# Patient Record
Sex: MALE | Race: WHITE | URBAN - METROPOLITAN AREA
[De-identification: names, ages, dates, MRNs, and addresses within clinical notes are randomized per-mention and may not be internally consistent; named-entity substitution may affect disease eponyms.]

---

## 2017-05-16 ENCOUNTER — HISTORICAL (OUTPATIENT)
Dept: RADIOLOGY | Facility: HOSPITAL | Age: 57
End: 2017-05-16

## 2017-10-26 ENCOUNTER — HISTORICAL (OUTPATIENT)
Dept: ADMINISTRATIVE | Facility: HOSPITAL | Age: 57
End: 2017-10-26

## 2017-11-01 LAB
FINAL CULTURE: NORMAL
FINAL CULTURE: NORMAL

## 2017-11-20 ENCOUNTER — HOSPITAL ENCOUNTER (OUTPATIENT)
Dept: ONCOLOGY | Facility: HOSPITAL | Age: 57
End: 2017-11-21
Attending: SURGERY | Admitting: SURGERY

## 2017-11-20 LAB
ABS NEUT (OLG): 5.4 X10(3)/MCL (ref 2.1–9.2)
ALBUMIN SERPL-MCNC: 2.5 GM/DL (ref 3.4–5)
ALBUMIN/GLOB SERPL: 0.5 RATIO (ref 1.1–2)
ALP SERPL-CCNC: 67 UNIT/L (ref 50–136)
ALT SERPL-CCNC: 17 UNIT/L (ref 12–78)
APTT PPP: 49.1 SECOND(S) (ref 24.8–36.9)
AST SERPL-CCNC: 19 UNIT/L (ref 15–37)
BASOPHILS # BLD AUTO: 0 X10(3)/MCL (ref 0–0.2)
BASOPHILS NFR BLD AUTO: 1 %
BILIRUB SERPL-MCNC: 0.4 MG/DL (ref 0.2–1)
BILIRUBIN DIRECT+TOT PNL SERPL-MCNC: 0.1 MG/DL (ref 0–0.5)
BILIRUBIN DIRECT+TOT PNL SERPL-MCNC: 0.3 MG/DL (ref 0–0.8)
BUN SERPL-MCNC: 8 MG/DL (ref 7–18)
CALCIUM SERPL-MCNC: 9 MG/DL (ref 8.5–10.1)
CHLORIDE SERPL-SCNC: 100 MMOL/L (ref 98–107)
CO2 SERPL-SCNC: 28 MMOL/L (ref 21–32)
COLOR STL: NORMAL
CONSISTENCY STL: NORMAL
CREAT SERPL-MCNC: 0.84 MG/DL (ref 0.7–1.3)
CROSSMATCH INTERPRETATION: NORMAL
EOSINOPHIL # BLD AUTO: 0.3 X10(3)/MCL (ref 0–0.9)
EOSINOPHIL NFR BLD AUTO: 4 %
ERYTHROCYTE [DISTWIDTH] IN BLOOD BY AUTOMATED COUNT: 16.4 % (ref 11.5–17)
GLOBULIN SER-MCNC: 4.8 GM/DL (ref 2.4–3.5)
GLUCOSE SERPL-MCNC: 101 MG/DL (ref 74–106)
GROUP & RH: NORMAL
HCT VFR BLD AUTO: 26.6 % (ref 42–52)
HEMOCCULT SP1 STL QL: NEGATIVE
HGB BLD-MCNC: 7.9 GM/DL (ref 14–18)
INR PPP: 1.54 (ref 0–1.27)
LYMPHOCYTES # BLD AUTO: 1.3 X10(3)/MCL (ref 0.6–4.6)
LYMPHOCYTES NFR BLD AUTO: 16 %
MCH RBC QN AUTO: 27 PG (ref 27–31)
MCHC RBC AUTO-ENTMCNC: 29.7 GM/DL (ref 33–36)
MCV RBC AUTO: 90.8 FL (ref 80–94)
MONOCYTES # BLD AUTO: 0.9 X10(3)/MCL (ref 0.1–1.3)
MONOCYTES NFR BLD AUTO: 12 %
NEUTROPHILS # BLD AUTO: 5.4 X10(3)/MCL (ref 1.4–7.9)
NEUTROPHILS NFR BLD AUTO: 68 %
PLATELET # BLD AUTO: 332 X10(3)/MCL (ref 130–400)
PMV BLD AUTO: 9.9 FL (ref 9.4–12.4)
POTASSIUM SERPL-SCNC: 4 MMOL/L (ref 3.5–5.1)
PRODUCT READY: NORMAL
PRODUCT READY: NORMAL
PROT SERPL-MCNC: 7.3 GM/DL (ref 6.4–8.2)
PROTHROMBIN TIME: 19 SECOND(S) (ref 12.2–14.7)
RBC # BLD AUTO: 2.93 X10(6)/MCL (ref 4.7–6.1)
SODIUM SERPL-SCNC: 134 MMOL/L (ref 136–145)
TRANSFUSION ORDER: NORMAL
WBC # SPEC AUTO: 8 X10(3)/MCL (ref 4.5–11.5)

## 2017-11-21 LAB
ABS NEUT (OLG): 5.03 X10(3)/MCL (ref 2.1–9.2)
BASOPHILS # BLD AUTO: 0 X10(3)/MCL (ref 0–0.2)
BASOPHILS NFR BLD AUTO: 1 %
COLOR STL: NORMAL
CONSISTENCY STL: NORMAL
EOSINOPHIL # BLD AUTO: 0.3 X10(3)/MCL (ref 0–0.9)
EOSINOPHIL NFR BLD AUTO: 4 %
ERYTHROCYTE [DISTWIDTH] IN BLOOD BY AUTOMATED COUNT: 16.1 % (ref 11.5–17)
HCT VFR BLD AUTO: 31.4 % (ref 42–52)
HEMOCCULT SP2 STL QL: NEGATIVE
HGB BLD-MCNC: 9.7 GM/DL (ref 14–18)
LYMPHOCYTES # BLD AUTO: 1 X10(3)/MCL (ref 0.6–4.6)
LYMPHOCYTES NFR BLD AUTO: 14 %
MCH RBC QN AUTO: 27.6 PG (ref 27–31)
MCHC RBC AUTO-ENTMCNC: 30.9 GM/DL (ref 33–36)
MCV RBC AUTO: 89.2 FL (ref 80–94)
MONOCYTES # BLD AUTO: 0.8 X10(3)/MCL (ref 0.1–1.3)
MONOCYTES NFR BLD AUTO: 12 %
NEUTROPHILS # BLD AUTO: 5.03 X10(3)/MCL (ref 1.4–7.9)
NEUTROPHILS NFR BLD AUTO: 70 %
PLATELET # BLD AUTO: 312 X10(3)/MCL (ref 130–400)
PMV BLD AUTO: 9.6 FL (ref 9.4–12.4)
RBC # BLD AUTO: 3.52 X10(6)/MCL (ref 4.7–6.1)
WBC # SPEC AUTO: 7.2 X10(3)/MCL (ref 4.5–11.5)

## 2017-11-27 LAB
FINAL CULTURE: NORMAL
FINAL CULTURE: NORMAL

## 2017-12-04 ENCOUNTER — HOSPITAL ENCOUNTER (OUTPATIENT)
Dept: EMERGENCY MEDICINE | Facility: HOSPITAL | Age: 57
End: 2017-12-05
Attending: SURGERY | Admitting: SURGERY

## 2017-12-04 LAB
ABS NEUT (OLG): 5.52 X10(3)/MCL (ref 2.1–9.2)
ALBUMIN SERPL-MCNC: 2.6 GM/DL (ref 3.4–5)
ALBUMIN/GLOB SERPL: 0.5 RATIO (ref 1.1–2)
ALP SERPL-CCNC: 79 UNIT/L (ref 50–136)
ALT SERPL-CCNC: 11 UNIT/L (ref 12–78)
APPEARANCE, UA: ABNORMAL
APTT PPP: 74.5 SECOND(S) (ref 24.8–36.9)
AST SERPL-CCNC: 16 UNIT/L (ref 15–37)
BACTERIA SPEC CULT: ABNORMAL /HPF
BASOPHILS # BLD AUTO: 0 X10(3)/MCL (ref 0–0.2)
BASOPHILS NFR BLD AUTO: 0 %
BILIRUB SERPL-MCNC: 0.5 MG/DL (ref 0.2–1)
BILIRUB UR QL STRIP: ABNORMAL
BILIRUBIN DIRECT+TOT PNL SERPL-MCNC: 0.2 MG/DL (ref 0–0.5)
BILIRUBIN DIRECT+TOT PNL SERPL-MCNC: 0.3 MG/DL (ref 0–0.8)
BUN SERPL-MCNC: 10 MG/DL (ref 7–18)
CALCIUM SERPL-MCNC: 9.4 MG/DL (ref 8.5–10.1)
CHLORIDE SERPL-SCNC: 100 MMOL/L (ref 98–107)
CO2 SERPL-SCNC: 28 MMOL/L (ref 21–32)
COLOR UR: ABNORMAL
CREAT SERPL-MCNC: 1.22 MG/DL (ref 0.7–1.3)
CROSSMATCH INTERPRETATION: NORMAL
EOSINOPHIL # BLD AUTO: 0.1 X10(3)/MCL (ref 0–0.9)
EOSINOPHIL NFR BLD AUTO: 2 %
ERYTHROCYTE [DISTWIDTH] IN BLOOD BY AUTOMATED COUNT: 16.6 % (ref 11.5–17)
FERRITIN SERPL-MCNC: 493.3 NG/ML (ref 8–388)
FOLATE SERPL-MCNC: 9 NG/ML (ref 3.1–17.5)
GLOBULIN SER-MCNC: 5.1 GM/DL (ref 2.4–3.5)
GLUCOSE (UA): NEGATIVE
GLUCOSE SERPL-MCNC: 104 MG/DL (ref 74–106)
GROUP & RH: NORMAL
HCT VFR BLD AUTO: 28.4 % (ref 42–52)
HGB BLD-MCNC: 8.5 GM/DL (ref 14–18)
HGB UR QL STRIP: ABNORMAL
INR PPP: 3.39 (ref 0–1.27)
IRON SATN MFR SERPL: 6.1 % (ref 20–50)
IRON SERPL-MCNC: 13 MCG/DL (ref 50–175)
KETONES UR QL STRIP: ABNORMAL
LEUKOCYTE ESTERASE UR QL STRIP: NEGATIVE
LYMPHOCYTES # BLD AUTO: 1.1 X10(3)/MCL (ref 0.6–4.6)
LYMPHOCYTES NFR BLD AUTO: 14 %
MCH RBC QN AUTO: 26.3 PG (ref 27–31)
MCHC RBC AUTO-ENTMCNC: 29.9 GM/DL (ref 33–36)
MCV RBC AUTO: 87.9 FL (ref 80–94)
MONOCYTES # BLD AUTO: 0.9 X10(3)/MCL (ref 0.1–1.3)
MONOCYTES NFR BLD AUTO: 12 %
NEUTROPHILS # BLD AUTO: 5.52 X10(3)/MCL (ref 1.4–7.9)
NEUTROPHILS NFR BLD AUTO: 72 %
NITRITE UR QL STRIP: NEGATIVE
PH UR STRIP: 5 [PH] (ref 5–9)
PLATELET # BLD AUTO: 351 X10(3)/MCL (ref 130–400)
PMV BLD AUTO: 9.6 FL (ref 9.4–12.4)
POTASSIUM SERPL-SCNC: 4.1 MMOL/L (ref 3.5–5.1)
PRODUCT READY: NORMAL
PROT SERPL-MCNC: 7.7 GM/DL (ref 6.4–8.2)
PROT UR QL STRIP: ABNORMAL
PROTHROMBIN TIME: 35.3 SECOND(S) (ref 12.2–14.7)
RBC # BLD AUTO: 3.23 X10(6)/MCL (ref 4.7–6.1)
RBC #/AREA URNS HPF: ABNORMAL /HPF
RET# (OHS): 0.04 X10^6/ML (ref 0.03–0.1)
RETICULOCYTE COUNT AUTOMATED (OLG): 1.3 % (ref 1.1–2.1)
SODIUM SERPL-SCNC: 137 MMOL/L (ref 136–145)
SP GR UR STRIP: 1.04 (ref 1–1.03)
SQUAMOUS EPITHELIAL, UA: ABNORMAL
TIBC SERPL-MCNC: 214 MCG/DL (ref 250–450)
TRANSFERRIN SERPL-MCNC: 157 MG/DL (ref 200–360)
TRANSFUSION ORDER: NORMAL
UROBILINOGEN UR STRIP-ACNC: 1
VIT B12 SERPL-MCNC: 400 PG/ML (ref 193–986)
WBC # SPEC AUTO: 7.7 X10(3)/MCL (ref 4.5–11.5)
WBC #/AREA URNS HPF: 24 /HPF (ref 0–3)

## 2017-12-05 LAB
ABS NEUT (OLG): 4.86 X10(3)/MCL (ref 2.1–9.2)
ABS NEUT (OLG): 5.91 X10(3)/MCL (ref 2.1–9.2)
ALBUMIN SERPL-MCNC: 2.4 GM/DL (ref 3.4–5)
ALBUMIN SERPL-MCNC: 2.5 GM/DL (ref 3.4–5)
ALBUMIN/GLOB SERPL: 0.5 {RATIO}
ALBUMIN/GLOB SERPL: 0.7 RATIO (ref 1.1–2)
ALP SERPL-CCNC: 67 UNIT/L (ref 50–136)
ALP SERPL-CCNC: 71 UNIT/L (ref 50–136)
ALT SERPL-CCNC: 10 UNIT/L (ref 12–78)
ALT SERPL-CCNC: 8 UNIT/L (ref 12–78)
AST SERPL-CCNC: 8 UNIT/L (ref 15–37)
AST SERPL-CCNC: 9 UNIT/L (ref 15–37)
BASOPHILS # BLD AUTO: 0 X10(3)/MCL (ref 0–0.2)
BASOPHILS # BLD AUTO: 0 X10(3)/MCL (ref 0–0.2)
BASOPHILS NFR BLD AUTO: 0 %
BASOPHILS NFR BLD AUTO: 0 %
BILIRUB SERPL-MCNC: 0.9 MG/DL (ref 0.2–1)
BILIRUB SERPL-MCNC: 1.4 MG/DL (ref 0.2–1)
BILIRUBIN DIRECT+TOT PNL SERPL-MCNC: 0.3 MG/DL (ref 0–0.2)
BILIRUBIN DIRECT+TOT PNL SERPL-MCNC: 0.4 MG/DL (ref 0–0.5)
BILIRUBIN DIRECT+TOT PNL SERPL-MCNC: 0.6 MG/DL (ref 0–0.8)
BILIRUBIN DIRECT+TOT PNL SERPL-MCNC: 1 MG/DL (ref 0–0.8)
BUN SERPL-MCNC: 11 MG/DL (ref 7–18)
BUN SERPL-MCNC: 11 MG/DL (ref 7–18)
CALCIUM SERPL-MCNC: 8.2 MG/DL (ref 8.5–10.1)
CALCIUM SERPL-MCNC: 8.9 MG/DL (ref 8.5–10.1)
CHLORIDE SERPL-SCNC: 103 MMOL/L (ref 98–107)
CHLORIDE SERPL-SCNC: 104 MMOL/L (ref 98–107)
CO2 SERPL-SCNC: 22 MMOL/L (ref 21–32)
CO2 SERPL-SCNC: 26 MMOL/L (ref 21–32)
CREAT SERPL-MCNC: 0.88 MG/DL (ref 0.7–1.3)
CREAT SERPL-MCNC: 1.08 MG/DL (ref 0.7–1.3)
EOSINOPHIL # BLD AUTO: 0.2 X10(3)/MCL (ref 0–0.9)
EOSINOPHIL # BLD AUTO: 0.2 X10(3)/MCL (ref 0–0.9)
EOSINOPHIL NFR BLD AUTO: 2 %
EOSINOPHIL NFR BLD AUTO: 3 %
ERYTHROCYTE [DISTWIDTH] IN BLOOD BY AUTOMATED COUNT: 16.3 % (ref 11.5–17)
ERYTHROCYTE [DISTWIDTH] IN BLOOD BY AUTOMATED COUNT: 16.4 % (ref 11.5–17)
GLOBULIN SER-MCNC: 3.6 GM/DL (ref 2.4–3.5)
GLOBULIN SER-MCNC: 4.8 GM/DL (ref 2.4–3.5)
GLUCOSE SERPL-MCNC: 80 MG/DL (ref 74–106)
GLUCOSE SERPL-MCNC: 89 MG/DL (ref 74–106)
HCT VFR BLD AUTO: 25.9 % (ref 42–52)
HCT VFR BLD AUTO: 29.7 % (ref 42–52)
HGB BLD-MCNC: 8.1 GM/DL (ref 14–18)
HGB BLD-MCNC: 9.3 GM/DL (ref 14–18)
LYMPHOCYTES # BLD AUTO: 0.8 X10(3)/MCL (ref 0.6–4.6)
LYMPHOCYTES # BLD AUTO: 0.9 X10(3)/MCL (ref 0.6–4.6)
LYMPHOCYTES NFR BLD AUTO: 10 %
LYMPHOCYTES NFR BLD AUTO: 13 %
MCH RBC QN AUTO: 27.1 PG (ref 27–31)
MCH RBC QN AUTO: 27.4 PG (ref 27–31)
MCHC RBC AUTO-ENTMCNC: 31.3 GM/DL (ref 33–36)
MCHC RBC AUTO-ENTMCNC: 31.3 GM/DL (ref 33–36)
MCV RBC AUTO: 86.6 FL (ref 80–94)
MCV RBC AUTO: 87.4 FL (ref 80–94)
MONOCYTES # BLD AUTO: 0.9 X10(3)/MCL (ref 0.1–1.3)
MONOCYTES # BLD AUTO: 1.1 X10(3)/MCL (ref 0.1–1.3)
MONOCYTES NFR BLD AUTO: 12 %
MONOCYTES NFR BLD AUTO: 15 %
NEUTROPHILS # BLD AUTO: 4.86 X10(3)/MCL (ref 2.1–9.2)
NEUTROPHILS # BLD AUTO: 5.91 X10(3)/MCL (ref 1.4–7.9)
NEUTROPHILS NFR BLD AUTO: 69 %
NEUTROPHILS NFR BLD AUTO: 75 %
PLATELET # BLD AUTO: 262 X10(3)/MCL (ref 130–400)
PLATELET # BLD AUTO: 295 X10(3)/MCL (ref 130–400)
PMV BLD AUTO: 9.3 FL (ref 9.4–12.4)
PMV BLD AUTO: 9.5 FL (ref 9.4–12.4)
POTASSIUM SERPL-SCNC: 3.6 MMOL/L (ref 3.5–5.1)
POTASSIUM SERPL-SCNC: 3.6 MMOL/L (ref 3.5–5.1)
PROT SERPL-MCNC: 6 GM/DL (ref 6.4–8.2)
PROT SERPL-MCNC: 7.3 GM/DL (ref 6.4–8.2)
RBC # BLD AUTO: 2.99 X10(6)/MCL (ref 4.7–6.1)
RBC # BLD AUTO: 3.4 X10(6)/MCL (ref 4.7–6.1)
SODIUM SERPL-SCNC: 137 MMOL/L (ref 136–145)
SODIUM SERPL-SCNC: 137 MMOL/L (ref 136–145)
WBC # SPEC AUTO: 7.1 X10(3)/MCL (ref 4.5–11.5)
WBC # SPEC AUTO: 7.9 X10(3)/MCL (ref 4.5–11.5)

## 2017-12-06 LAB — FINAL CULTURE: NO GROWTH

## 2017-12-11 LAB
FINAL CULTURE: NORMAL
FINAL CULTURE: NORMAL

## 2019-03-27 ENCOUNTER — HISTORICAL (OUTPATIENT)
Dept: WOUND CARE | Facility: HOSPITAL | Age: 59
End: 2019-03-27

## 2019-04-03 ENCOUNTER — HISTORICAL (OUTPATIENT)
Dept: WOUND CARE | Facility: HOSPITAL | Age: 59
End: 2019-04-03

## 2019-04-05 ENCOUNTER — HISTORICAL (OUTPATIENT)
Dept: WOUND CARE | Facility: HOSPITAL | Age: 59
End: 2019-04-05

## 2019-04-10 ENCOUNTER — HISTORICAL (OUTPATIENT)
Dept: WOUND CARE | Facility: HOSPITAL | Age: 59
End: 2019-04-10

## 2019-04-17 ENCOUNTER — HISTORICAL (OUTPATIENT)
Dept: WOUND CARE | Facility: HOSPITAL | Age: 59
End: 2019-04-17

## 2019-04-24 ENCOUNTER — HISTORICAL (OUTPATIENT)
Dept: WOUND CARE | Facility: HOSPITAL | Age: 59
End: 2019-04-24

## 2019-05-01 ENCOUNTER — HISTORICAL (OUTPATIENT)
Dept: WOUND CARE | Facility: HOSPITAL | Age: 59
End: 2019-05-01

## 2019-05-08 ENCOUNTER — HISTORICAL (OUTPATIENT)
Dept: WOUND CARE | Facility: HOSPITAL | Age: 59
End: 2019-05-08

## 2019-05-15 ENCOUNTER — HISTORICAL (OUTPATIENT)
Dept: WOUND CARE | Facility: HOSPITAL | Age: 59
End: 2019-05-15

## 2019-05-16 LAB — GRAM STN SPEC: NORMAL

## 2019-05-19 LAB — FINAL CULTURE: NORMAL

## 2019-05-22 ENCOUNTER — HISTORICAL (OUTPATIENT)
Dept: WOUND CARE | Facility: HOSPITAL | Age: 59
End: 2019-05-22

## 2019-06-05 ENCOUNTER — HISTORICAL (OUTPATIENT)
Dept: WOUND CARE | Facility: HOSPITAL | Age: 59
End: 2019-06-05

## 2019-06-19 ENCOUNTER — HISTORICAL (OUTPATIENT)
Dept: WOUND CARE | Facility: HOSPITAL | Age: 59
End: 2019-06-19

## 2019-07-03 ENCOUNTER — HISTORICAL (OUTPATIENT)
Dept: WOUND CARE | Facility: HOSPITAL | Age: 59
End: 2019-07-03

## 2019-07-16 ENCOUNTER — HISTORICAL (OUTPATIENT)
Dept: WOUND CARE | Facility: HOSPITAL | Age: 59
End: 2019-07-16

## 2019-07-23 ENCOUNTER — HISTORICAL (OUTPATIENT)
Dept: WOUND CARE | Facility: HOSPITAL | Age: 59
End: 2019-07-23

## 2019-07-30 ENCOUNTER — HISTORICAL (OUTPATIENT)
Dept: WOUND CARE | Facility: HOSPITAL | Age: 59
End: 2019-07-30

## 2019-08-06 ENCOUNTER — HISTORICAL (OUTPATIENT)
Dept: WOUND CARE | Facility: HOSPITAL | Age: 59
End: 2019-08-06

## 2020-01-09 ENCOUNTER — HISTORICAL (OUTPATIENT)
Dept: ADMINISTRATIVE | Facility: HOSPITAL | Age: 60
End: 2020-01-09

## 2020-01-12 LAB — FINAL CULTURE: NORMAL

## 2020-01-16 ENCOUNTER — HISTORICAL (OUTPATIENT)
Dept: ENDOSCOPY | Facility: HOSPITAL | Age: 60
End: 2020-01-16

## 2020-06-12 ENCOUNTER — HISTORICAL (OUTPATIENT)
Dept: LAB | Facility: HOSPITAL | Age: 60
End: 2020-06-12

## 2020-06-12 LAB
ABS NEUT (OLG): 4.1 X10(3)/MCL (ref 1.5–6.9)
ALBUMIN SERPL-MCNC: 3.9 GM/DL (ref 3.4–4.8)
ALBUMIN/GLOB SERPL: 1.2 RATIO (ref 1.1–2)
ALP SERPL-CCNC: 82 UNIT/L (ref 40–150)
ALT SERPL-CCNC: 20 UNIT/L (ref 0–55)
AST SERPL-CCNC: 20 UNIT/L (ref 5–34)
BILIRUB SERPL-MCNC: 0.3 MG/DL
BILIRUBIN DIRECT+TOT PNL SERPL-MCNC: 0.1 MG/DL (ref 0–0.5)
BILIRUBIN DIRECT+TOT PNL SERPL-MCNC: 0.2 MG/DL (ref 0–0.8)
BUN SERPL-MCNC: 19 MG/DL (ref 8.4–25.7)
CALCIUM SERPL-MCNC: 9.4 MG/DL (ref 8.8–10)
CHLORIDE SERPL-SCNC: 103 MMOL/L (ref 98–107)
CO2 SERPL-SCNC: 27 MMOL/L (ref 23–31)
CREAT SERPL-MCNC: 1.05 MG/DL (ref 0.73–1.18)
DIGOXIN SERPL-MCNC: 1.41 NG/ML (ref 0.8–2)
ERYTHROCYTE [DISTWIDTH] IN BLOOD BY AUTOMATED COUNT: 14.3 % (ref 11.5–17)
GLOBULIN SER-MCNC: 3.2 GM/DL (ref 2.4–3.5)
GLUCOSE SERPL-MCNC: 111 MG/DL (ref 82–115)
HCT VFR BLD AUTO: 41.1 % (ref 42–52)
HGB BLD-MCNC: 13.1 GM/DL (ref 14–18)
MCH RBC QN AUTO: 29 PG (ref 27–34)
MCHC RBC AUTO-ENTMCNC: 32 GM/DL (ref 31–36)
MCV RBC AUTO: 91 FL (ref 80–99)
PLATELET # BLD AUTO: 163 X10(3)/MCL (ref 140–400)
PMV BLD AUTO: 11.7 FL (ref 6.8–10)
POTASSIUM SERPL-SCNC: 4.5 MMOL/L (ref 3.5–5.1)
PROT SERPL-MCNC: 7.1 GM/DL (ref 5.8–7.6)
RBC # BLD AUTO: 4.5 X10(6)/MCL (ref 4.7–6.1)
SODIUM SERPL-SCNC: 138 MMOL/L (ref 136–145)
WBC # SPEC AUTO: 5.9 X10(3)/MCL (ref 4.5–11.5)

## 2020-06-18 ENCOUNTER — HISTORICAL (OUTPATIENT)
Dept: CARDIOLOGY | Facility: HOSPITAL | Age: 60
End: 2020-06-18

## 2020-09-24 ENCOUNTER — HISTORICAL (OUTPATIENT)
Dept: RADIOLOGY | Facility: HOSPITAL | Age: 60
End: 2020-09-24

## 2020-11-13 ENCOUNTER — HISTORICAL (OUTPATIENT)
Dept: ADMINISTRATIVE | Facility: HOSPITAL | Age: 60
End: 2020-11-13

## 2020-11-13 LAB
BUN SERPL-MCNC: 19.6 MG/DL (ref 8.4–25.7)
CALCIUM SERPL-MCNC: 9.2 MG/DL (ref 8.8–10)
CHLORIDE SERPL-SCNC: 103 MMOL/L (ref 98–107)
CO2 SERPL-SCNC: 30 MMOL/L (ref 23–31)
CREAT SERPL-MCNC: 0.93 MG/DL (ref 0.73–1.18)
CREAT/UREA NIT SERPL: 21
GLUCOSE SERPL-MCNC: 97 MG/DL (ref 82–115)
POTASSIUM SERPL-SCNC: 4.7 MMOL/L (ref 3.5–5.1)
SODIUM SERPL-SCNC: 141 MMOL/L (ref 136–145)

## 2022-04-29 NOTE — ED PROVIDER NOTES
"   Patient:   Long Salazar            MRN: [id number]            FIN: [id number]               Age:   57 years     Sex:  Male     :  1960   Associated Diagnoses:   Abdominal aortic aneurysm   Author:   Phyllis AKERS, Peterson LOZANO      Basic Information   Time seen: Date & time 2017 16:48:00.   History source: Patient.   Arrival mode: Private vehicle.   History limitation: None.   Additional information: Patient's physician(s): Dr Warner Holguin, Chief Complaint from Nursing Triage Note : Chief Complaint   2017 16:46 CST      Chief Complaint           sent by Dr. Mata for "low blood count", reports  checked blood levels this am and they were low; here x 2 weeks ago for same reason; had abd aneursym graft sx d/t graft infection x 6 weeks ago; receiving antibx in PICC now; BP 90/51 in triage  (Modified)   .      History of Present Illness   The patient presents with I, Dr. Ferguson assumed care of the patient at .  58 y/o CM presents to the ED after being referred by Dr Mata due to abnormal labs. The pt blood levels was checked this morning and came back low. Pt reports associated weakness and decrease appetite. Pt had a EVAR done x4 years ago by Dr Ricketts. The aortic stent became infected and was repaired by Dr Mata. Pt is 5 week status post aortic valve replacement and hernia repair. .  The course/duration of symptoms is constant.  Location: generalized. The character of symptoms is Weakness.  The degree at onset was moderate.  The degree at present is minimal.  Risk factors consist of hypertension.  Therapy today: none.     The patient presents with 58 y/o male with h/o HTN/HLP/CAD/PAD/AAA/a-fib presented to [facility] with c/o anemia.  He is had a prior EVAR(Jamarcus) that became infected and we performed explant along with cadaveric reconstruction.  He speciated Cloostridium and he is on 6 weeks of Zosyn. Sent back to ER today due to low blood count. Wife also reports decreased appetite due " to diarrhea with any oral intact. Marcia NP   .        Review of Systems   Constitutional symptoms:  Weakness, decreased appetite.    Skin symptoms:  Negative except as documented in HPI.   Eye symptoms:  Negative except as documented in HPI.   ENMT symptoms:  Negative except as documented in HPI.   Respiratory symptoms:  Negative except as documented in HPI.   Cardiovascular symptoms:  Negative except as documented in HPI.   Gastrointestinal symptoms:  Negative except as documented in HPI.   Genitourinary symptoms:  Negative except as documented in HPI.   Musculoskeletal symptoms:  Negative except as documented in HPI.   Neurologic symptoms:  Negative except as documented in HPI.   Psychiatric symptoms:  Negative except as documented in HPI.   Endocrine symptoms:  Negative except as documented in HPI.   Hematologic/Lymphatic symptoms:  Negative except as documented in HPI.   Allergy/immunologic symptoms:  Negative except as documented in HPI.             Additional review of systems information: All other systems reviewed and otherwise negative.      Health Status   Allergies:    Allergic Reactions (Selected)  No Known Allergies.   Medications:  (Selected)   Documented Medications  Documented  Diltia  mg/24 hours oral capsule, extended release: 240 mg = 1 cap(s), Oral, Daily, # 30 cap(s), 0 Refill(s)  Hydrocodone/Apap 10/325: Oral, QID, PRN PRN pain, 0 Refill(s)  Livalo: 2 mg, Oral, Daily, 0 Refill(s)  Xarelto 20mg Tablet: 20 mg = 1 tab(s), Oral, Once, # 30 tab(s), 0 Refill(s)  Zosyn: 3.375 gm, IV Piggyback, TID, 0 Refill(s)  carvedilol 25 mg oral tablet: 25 mg = 1 tab(s), Oral, BID, # 180 tab(s), 0 Refill(s)  cyclobenzaprine 10 mg oral tablet: 10 mg = 1 tab(s), Oral, TID, PRN PRN for spasm, # 30 tab(s), 0 Refill(s)  digoxin 250 mcg (0.25 mg) oral tablet: 250 mcg = 1 tab(s), Oral, Daily, # 30 tab(s), 0 Refill(s)  isosorbide MONOnitrate 30 mg oral tablet, Extended Release: 30 mg = 1 tab(s), Oral, qAM, #  30 tab(s), 0 Refill(s)  lisinopril 10 mg oral tablet: 10 mg = 1 tab(s), Oral, Daily, # 30 tab(s), 0 Refill(s)  .      Past Medical/ Family/ Social History   Medical history:    Resolved  ATRIAL FIBRILLATION (427.31):  Resolved.  CHF (congestive heart failure) (428.0):  Resolved.  CABG - Coronary artery bypass graft ([id number]):  Resolved.  HTN (hypertension) (401.9):  Resolved.  Hyperlipidemia (83956056):  Resolved.  Spinal stenosis (004274261):  Resolved.  CAD (coronary artery disease) ([id number]):  Resolved.  Neuropathy (9214467078):  Resolved.  PVD (peripheral vascular disease) ([id number]):  Resolved..   Surgical history:    Hernia Repair Incisional (Right) on 11/1/2017 at 57 Years.  Comments:  11/1/2017 14:29 - Bekah Cano RN  auto-populated from documented surgical case  Abdominal Aortic Aneurysmectomy (.) on 10/28/2017 at 57 Years.  Comments:  10/28/2017 16:04 - Pema Sorto RN  auto-populated from documented surgical case  Bypass Femoral Popliteal on 6/12/2016 at 55 Years.  Comments:  6/12/2016 13:15 - Adela Levy RN  auto-populated from documented surgical case  Embolectomy on 6/12/2016 at 55 Years.  Comments:  6/12/2016 13:15 - Adela Levy RN  auto-populated from documented surgical case  Bypass Femoral Tibial (Left) on 6/8/2016 at 55 Years.  Comments:  6/8/2016 13:30 - Jacey Martin RN  auto-populated from documented surgical case  Bypass Femoral Tibial (Left) on 2/1/2016 at 55 Years.  Comments:  2/1/2016 17:24 - Rosette Hopkins RN  auto-populated from documented surgical case  Injection Lumbar Epidural Steroid (., Back) on 12/9/2014 at 54 Years.  Comments:  12/9/2014 10:00 - Laurel Hu RN  auto-populated from documented surgical case  Injection Lumbar Epidural Steroid (.) on 8/12/2014 at 53 Years.  Comments:  8/12/2014 11:23 - Emperatriz TORRES , Lorraine Chapman  auto-populated from documented surgical  case  Injection Lumbar Epidural Steroid (., None) on 7/3/2014 at 53 Years.  Comments:  7/3/2014 10:05 - Hal Manzo RN  auto-populated from documented surgical case  Injection Lumbar Epidural Steroid (.) on 4/1/2014 at 53 Years.  Comments:  4/1/2014 13:00 - Odalis Laguna RN  auto-populated from documented surgical case  Coronary artery bypass, using arterial graft(s) (89823) in 1999 at 39 Years.  Appendectomy; for ruptured appendix with abscess or generalized peritonitis (76669) in 1992 at 32 Years.  hernia on 8/6/1975 at 14 Years.  Hemorrhoidectomy (07631214).  AAA (abdominal aortic aneurysm) ([id number]).  CABG - Coronary artery bypass graft ([id number])..   Family history:    No family history items have been selected or recorded..   Social history: Alcohol use: Denies, Tobacco use: former, Drug use: Denies.      Physical Examination               Vital Signs   Vital Signs   12/4/2017 19:02 CST      Peripheral Pulse Rate     53 bpm  LOW                             Respiratory Rate          20 br/min                             SpO2                      99 %                             Oxygen Therapy            Room air                             Systolic Blood Pressure   98 mmHg                             Diastolic Blood Pressure  45 mmHg  LOW    12/4/2017 16:46 CST      Temperature Oral          36.4 DegC                             Temperature Oral (calculated)             97.52 DegF                             Peripheral Pulse Rate     65 bpm                             Respiratory Rate          18 br/min                             SpO2                      99 %                             Oxygen Therapy            Room air  .   Measurements   12/4/2017 16:46 CST      Weight Dosing             90.5 kg                             Weight Measured and Calculated in Lbs     199.52 lb                             Weight Estimated          90.5 kg                              Height/Length Dosing      182 cm                             Height/Length Estimated   182 cm                             Body Mass Index Estimated 27.32 kg/m2  .   Basic Oxygen Information   12/4/2017 19:02 CST      SpO2                      99 %                             Oxygen Therapy            Room air    12/4/2017 16:46 CST      SpO2                      99 %                             Oxygen Therapy            Room air  .   General:  Alert, no acute distress, well appearing, conversant.    Skin:  Warm, dry, intact.    Head:  Normocephalic, atraumatic.    Neck:  Supple, trachea midline.    Eye:  Pupils are equal, round and reactive to light, extraocular movements are intact, normal conjunctiva.    Ears, nose, mouth and throat:  Oral mucosa moist.   Cardiovascular:  Regular rate and rhythm, No murmur, Normal peripheral perfusion, No edema.    Respiratory:  Lungs are clear to auscultation, respirations are non-labored, breath sounds are equal, Symmetrical chest wall expansion.    Chest wall:  No tenderness.   Musculoskeletal:  Normal ROM, normal strength, no tenderness, no swelling, no deformity.    Gastrointestinal:  Soft, Non distended, Patient has abdominal incisions sites that are clean, dry and intact., Tenderness: Moderate, Guarding: Negative, Rebound: Negative.    Neurological:  Alert and oriented to person, place, time, and situation, No focal neurological deficit observed, CN II-XII intact, normal sensory observed, normal motor observed, normal speech observed, normal coordination observed.    Psychiatric:  Cooperative, appropriate mood & affect, normal judgment.       Medical Decision Making   Documents reviewed:  Emergency department nurses' notes.   Orders  Launch Orders   Laboratory:  CMP (Order): Stat collect, 12/4/2017 16:50 CST, Blood, Lab Collect, Print Label By Order Location, 12/4/2017 16:50 CST  CBC w/ Auto Diff (Order): Now collect, 12/4/2017 16:50 CST, Blood, Lab Collect, Print Label By  Order Location, 12/4/2017 16:50 CST  Urinalysis Complete a reflex to culture (Order): Stat collect, Urine, 12/4/2017 16:50 CST, Nurse collect, Print Label By Order Location.   Results review:  Lab results : Lab View   12/4/2017 21:02 CST      Iron Lvl                  13 mcg/dL  LOW                             Transferrin               157.0 mg/dL  LOW                             TIBC                      214 mcg/dL  LOW                             Iron Sat                  6.1 %  LOW                             Ferritin Lvl              493.3 ng/mL  HI                             Folate Lvl                9.0 ng/mL                             Vitamin B12 Lvl           400 pg/mL                             Retic Cnt Auto            1.3 %                             RET#                      0.040 x10^6/mL                             ABO/Rh                    O POS    12/4/2017 20:04 CST      UA Appear                 CLOUDY                             UA Color                  YISSEL                             UA Spec Grav              1.040  HI                             UA Bili                   1+                             UA pH                     5.0                             UA Urobilinogen           1.0                             UA Blood                  1+                             UA Glucose                Negative                             UA Ketones                Trace                             UA Protein                1+                             UA Nitrite                Negative                             UA Leuk Est               Negative                             UA WBC                    24 /HPF  HI                             UA RBC                    5-10 /HPF                             UA Bacteria               NONE SEEN /HPF                             UA Squam Epithelial       NONE SEEN                             UA Gran Cast              Rare    12/4/2017 17:22 CST       Sodium Lvl                137 mmol/L                             Potassium Lvl             4.1 mmol/L                             Chloride                  100 mmol/L                             CO2                       28.0 mmol/L                             Calcium Lvl               9.4 mg/dL                             Glucose Lvl               104 mg/dL                             BUN                       10.0 mg/dL                             Creatinine                1.22 mg/dL                             eGFR-AA                   >60 mL/min/1.73 m2  NA                             eGFR-GERMAINE                  >60 mL/min/1.73 m2  NA                             Bili Total                0.5 mg/dL                             Bili Direct               0.20 mg/dL                             Bili Indirect             0.30 mg/dL                             AST                       16 unit/L                             ALT                       11 unit/L  LOW                             Alk Phos                  79 unit/L                             Total Protein             7.7 gm/dL                             Albumin Lvl               2.60 gm/dL  LOW                             Globulin                  5.10 gm/dL  HI                             A/G Ratio                 0.5 ratio  LOW                             WBC                       7.7 x10(3)/mcL                             RBC                       3.23 x10(6)/mcL  LOW                             Hgb                       8.5 gm/dL  LOW                             Hct                       28.4 %  LOW                             Platelet                  351 x10(3)/mcL                             MCV                       87.9 fL                             MCH                       26.3 pg  LOW                             MCHC                      29.9 gm/dL  LOW                             RDW                       16.6 %                             MPV                        9.6 fL                             Abs Neut                  5.52 x10(3)/mcL                             Neutro Auto               72 %  NA                             Lymph Auto                14 %                             Mono Auto                 12 %  NA                             Eos Auto                  2 %  NA                             Abs Eos                   0.1 x10(3)/mcL                             Basophil Auto             0 %  NA                             Abs Neutro                5.52 x10(3)/mcL                             Abs Lymph                 1.1 x10(3)/mcL                             Abs Mono                  0.9 x10(3)/mcL                             Abs Baso                  0.0 x10(3)/mcL    .   Radiology results:  Reported at  2017 21:56:00, Computed tomography, Abd. angio,       Report Submission Date: Dec 4, 2017 9:56:55 PM CST  Patient   Study Initiated: Dec 4, 2017 8:32:08 PM CST  Name: JOHNATHAN COLBY   Study Received:  Dec 4, 2017 9:01:23 PM CST  MRN: [id number]   Modality Type: CT  Gender: M    Description: ABDOMEN ABD_ANGIO_WO_W (ADULT)  : 56    Body Part: abdomen   Institution[facility]    DICOM Inst.:  [facility]  Physician:           History:  , reports  checked blood levels this am and they were low; here x 2 weeks ago for same reason; had abd aneursym graft sx d/t graft infection x 6 weeks ago (Hx) / Complication of vascular graft/device (DICOM Hx)     Comparison: 3/2/16     CT abdomen and pelvis:     Visualized lung bases are within normal limits.     The liver is normal in appearance.  There is no evidence of a gallstone.  There is no kidney stone.  Right-sided hydronephrosis with a delayed nephrogram.  Prominent right ureter extending to inflammation in the pelvis.  Decreased vascularization involving the lower pole of the left kidney.     Aortic stent graft seen on the prior examination is not present.  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 fusiform dilatation of the infrarenal abdominal aorta measuring 3.2 x 3.1 cm.  Focal inferior stenosis is present. suspected aortoiliac graft.  Fusiform dilatation of the right common iliac artery measuring 7.0 x 8.7 cm with periaortic inflammation.  Extensive plaque in the internal and   The arteries.      Large and small bowel are normal in appearance without ileus or obstruction.  colonic diverticula are present.The appendix is not seen.  Right inguinal hernia with fat..  There is  free fluid in the pelvis.  Bones are intact.    Impression  1.  Fusiform dilatation of the right common iliac artery measuring 7.0 x 8.7 cm with surrounding inflammation.  Focal arterial vascular inflammation suspected.   2.  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   3.  Fusiform dilatation of the infrarenal abdominal aorta measuring 3.3 x 3.1 cm.   4.  Suspected aortoiliac graft.  Correlate with surgical history.  Previously noted aortic stent graft is no longer present.   5.  Right-sided hydronephrosis no evidence of an obstructive stone.  Delayed nephrogram is present on the right.   6.  Decreased vascularization involving the lower pole of the left kidney suggest decreased vascularization.   7.  Diverticulosis   8.  Right inguinal hernia.     Electronically signed on Dec 4, 2017 9:56:55 PM CST by:  Peg Brooks MD  Diplomate, [employer] Board of Radiology.       Reexamination/ Reevaluation   Time: 12/4/2017 22:15:00 .   Vital signs   results included from flowsheet : Vital Signs   12/4/2017 22:10 CST      Peripheral Pulse Rate     78 bpm                             SpO2                      100 %                             Oxygen Therapy            Room air                             Systolic Blood Pressure   117 mmHg                             Diastolic Blood  Pressure  67 mmHg                             Mean Arterial Pressure, Cuff              84 mmHg     Course: improving.   Pain status: decreased.      Impression and Plan   Diagnosis   Abdominal aortic aneurysm ([id number] I71.4)      Calls-Consults   -  12/4/2017 22:03:00 , Adolfo LOMBARDO MD, Rodolfo Steven, paged Dr Mata, (spoke with Dr. Mata also prior to pt coming in to hospital).    -  12/4/2017 22:06:00 , Adolfo LOMBARDO MD, Rodolfo Steven, consult, recommends admit to him. Start patient on Zosyn antibiotics and do transfusion. Patient will most likely be transferred to [place] in the morning., saw pt in ED.    Plan   Condition: Stable.    Disposition: Admit time  12/4/2017 22:15:00, Admit to Inpatient Telemetry Unit.    Counseled: Patient, Regarding diagnosis, Regarding diagnostic results, Regarding treatment plan, Patient indicated understanding of instructions.    Notes: IAlyce, acted solely as a scribe for and in the presence of Dr. Ferguson who performed the service..       Addendum      Teaching-Supervisory Addendum-Brief   Notes: I, Dr. Ferguson, personally performed the services described in this documentation as scribed in my presence and it is both accurate and complete..

## 2022-04-29 NOTE — DISCHARGE SUMMARY
DISCHARGE DATE:  12/05/2017    DISCHARGE DIAGNOSES:    1. Infected abdominal aortic graft.  2. Hypertension.  3. Hyperlipidemia.  4. Coronary artery disease.  5. Peripheral arterial disease.    BRIEF HISTORY:  The patient is a 57-year-old male with multiple medical comorbidities.  He had an endovascular aneurysm repair by Dr. Ricketts several years ago and this was ultimately, recently found to be grossly infected.  He underwent explant of this endograft along with replacement of this aortic segment with a cadaveric aorta.  The patient did well following this procedure, however he is seen in the Emergency Room with fatigue and anemia.    HOSPITAL COURSE:  The patient was seen at [facility].  He has had anemia in the past requiring multiple blood transfusions and he also has fatigue.  His prior endograft was explanted and was infected with clostridium and he is currently on IV antibiotics at home.  Upon evaluation in the Emergency Department, his abdomen was soft and nontender, however CT scan revealed anastomotic pseudoaneurysm break down of his cadaveric aortic replacement.  That would certainly be concerning of ongoing intraabdominal infection.  The case was initially discussed with Dr. Jefferson Vazquez in [place] for transfer and further care, however the patient will proceed to [facility] in [place].  His Xarelto was held in the interim and he will likely plan for resection of this cadaveric implant and extra anatomic bypass.  At the time of transfer, the patient was hemodynamically stable and denied abdominal pain.        ______________________________  MD LUISANA Perrin III/TRACI  DD:  01/26/2018  Time:  05:20AM  DT:  01/26/2018  Time:  03:37PM  Job #:  [id number]

## 2022-04-29 NOTE — H&P
Patient:   Long Salazar            MRN: [id number]            FIN: [id number]               Age:   57 years     Sex:  Male     :  1960   Associated Diagnoses:   None   Author:   Adolfo LOMBARDO MD, Rodolfo Steven      History of Present Illness   58 y/o male with h/o HTN/HLP/CAD/PAD/AAA/a-fib presented to [facility] with c/o anemia.  He is had a prior EVAR(Jamarcus) that became infected and we performed explant along with cadaveric reconstruction.  He speciated Cloostridium and he is on 6 weeks of Zosyn.  Home health with scheduled H/H and this has declinded over the past 2 weeks.  Currently, he states he feels well.  Denies fevers opr abdominal pain.         Review of Systems   Constitutional:  No fever, No chills.    Eye:  No recent visual problem.    Ear/Nose/Mouth/Throat:  No decreased hearing.    Respiratory:  No shortness of breath, No cough.    Cardiovascular:  No chest pain.    Gastrointestinal:  No nausea, No vomiting.    Genitourinary:  No dysuria.    Musculoskeletal:  No claudication.    Integumentary:  No rash.    Neurologic:  Alert and oriented X4.       Histories   PMH - HTN, HLP, CAD, PAD, a-fib, AAA  PSurgH - EVAR(Jamarcus), RLE stents, L fem-->AT bypass, appendectomy, hernia repair, CABG, EVAR explant with cadaveric reconstruction  Social - Denies tobacco  Family - non-contributory, hernia repair, CABG          Physical Examination   WDWN male in NAD  AAO x 4  EOMI  CTA B  RRR, nl S1S2  soft, NT/ND,+BS,   palpable femoral pulses, palpable tibial bypass  no edema          Impression and Plan   58 y/o male with anemia  -Will f/u H/DH following PRBC transfusion and will plan for d/c if there is an appropriate response.  He likely has some degree of anemia from his recovery.  He is also on Xarelto.  With his graft Clostridium infection, he should also have c-scope to rule out any colon pathology, but this can be done as an outpatient and he will have to hold Xarelto.

## 2022-04-29 NOTE — H&P
Patient:   Long Salazar            MRN: [id number]            FIN: [id number]               Age:   57 years     Sex:  Male     :  1960   Associated Diagnoses:   None   Author:   Adolfo LOMBARDO MD, Rodolfo Steven      History of Present Illness   58 y/o male with h/o HTN/HLP/CAD/PAD/AAA/a-fib presented to [facility] with c/o anemia/fatigue.  He is had a prior EVAR(Dr. Ricketts) ~ 4 years ago that recently became infected and we performed explant along with cadaveric reconstruction ~ 6 weeks ago.  He speciated Clostridium and he is on 6 weeks of Zosyn.  Home health with scheduled H/H and this has declinded over the past 2 weeks requiring PRBC in the past.  Currently, he states he feels fatigued.  Denies fevers or abdominal pain.         Review of Systems   Constitutional:  Weakness, Fatigue, No fever.    Eye:  No recent visual problem.    Ear/Nose/Mouth/Throat:  No decreased hearing.    Respiratory:  No shortness of breath, No cough.    Cardiovascular:  No chest pain.    Gastrointestinal:  Nausea, No vomiting.    Genitourinary:  No dysuria.    Hematology/Lymphatics:  No bruising tendency.    Musculoskeletal:  No claudication.    Integumentary:  No rash.    Neurologic:  Alert and oriented X4.       Histories   PMH - HTN, HLP, CAD, PAD, a-fib, AAA  PSurgH - EVAR(Jamarcus), RLE stents, L fem-->AT bypass, appendectomy, hernia repair, CABG, EVAR explant with cadaveric reconstruction  Social - Denies tobacco  Family - non-contributory, hernia repair, CABG          Physical Examination   WDWN male in NAD  AAO x 4  EOMI  CTA B  RRR, nl S1S2  soft, NT/ND,+BS,   palpable femoral pulses, palpable LLEtibial bypass  no edema          Impression and Plan   58 y/o male s/p EVAR explant with cadaveric reconstruction and anemia  -CT abd/pelvis with anastomotic degeneration of his aortic anastomosis and the 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  There is concern of continued infection which has precipitated  these findings.  I discussed the case with Dr. Jefferson Vazquez in [place] and we will plan for transfer in the AM.  We will transfuse 2u PRBC tonight for Hg 8.5.  He will  likely require explant of this cadaveric graft and extraanatomic reconstruction.  He will hold Xarelto in the interim.

## 2022-04-29 NOTE — ED PROVIDER NOTES
"   Patient:   Long Salazar            MRN: [id number]            FIN: [id number]               Age:   57 years     Sex:  Male     :  1960   Associated Diagnoses:   Diarrhea; Anemia; Abnormal laboratory findings   Author:   Manas AKERS, Ankit HAMMER      Basic Information   Time seen: Date & time 2017 15:58:00.   History source: Patient.   Arrival mode: Private vehicle, wheelchair.   History limitation: None.   Additional information: Patient's physician(s): Adolfo LOMBARDO MD, Ezio Taylor MD, Raleigh Morales MD, ALBINA Castillo have [facility]      [address]     [facility]      History of Present Illness   The patient presents with "feeling sick", sent in for abn lab and Please see below.     The patient presents with Patient sent by Dr. Carlo Mata for abnormal labs.  Patient is 3 week status post aortic valve replacement and hernia repair. His H&H was reportedly low and sent to the emergency department. DARRIN Hanyes in [facility] and 56 y/o white male status post aortic valve replacement 3 weeks ago presents to ED with family at bedside due to abnormal lab findings. Patient states home health called and told him to come to ED because his H&H was low. Per family, they were told it was 7. Patient is on IV Zosyn q8hrs. Patient reports feeling sluggish, decreased appetite, and diarrhea. Patient denies fever, leg pain, and black/tarry stools. Patient states he occasional have small amount of bright red blood but he does have hemorrhoids..  The onset was unknown.  Lab test value low H&H.  Associated symptoms: sluggish feeling and diarrhea.  Risk factors consist of hypertension, coronary artery disease and atrial fibrillation.  Prior episodes: none.  Therapy today: prescription medications including Zosyn.        Review of Systems   Constitutional symptoms:  Decreased appetite, sluggish feeling, No fever,    Skin symptoms:  Negative except as documented in HPI.   Eye symptoms:  Negative " except as documented in HPI.   ENMT symptoms:  Negative except as documented in HPI.   Respiratory symptoms:  Negative except as documented in HPI.   Cardiovascular symptoms:  Negative except as documented in HPI.   Gastrointestinal symptoms:  Diarrhea, denies black/tarry stool.    Genitourinary symptoms:  Negative except as documented in HPI.   Musculoskeletal symptoms:  denies leg pain.   Neurologic symptoms:  Negative except as documented in HPI.   Psychiatric symptoms:  Negative except as documented in HPI.   Endocrine symptoms:  Negative except as documented in HPI.   Hematologic/Lymphatic symptoms:  Negative except as documented in HPI.   Allergy/immunologic symptoms:  Negative except as documented in HPI.             Additional review of systems information: All other systems reviewed and otherwise negative.      Health Status   Allergies:    Allergic Reactions (Selected)  No Known Allergies.   Medications:  (Selected)   Prescriptions  Prescribed  Percocet 5/325 oral tablet: 1 tab(s), Oral, q6hr, PRN PRN pain, # 50 tab(s), 0 Refill(s)  Documented Medications  Documented  Diltia  mg/24 hours oral capsule, extended release: 240 mg = 1 cap(s), Oral, Daily, # 30 cap(s), 0 Refill(s)  Hydrocodone/Apap 10/325: Oral, QID, PRN PRN pain, 0 Refill(s)  Promethazine 25 Mg Tablet: 25 mg = 1 tab(s), Oral, q4hr  Xarelto 20mg Tablet: 20 mg = 1 tab(s), Oral, Once, # 30 tab(s), 0 Refill(s)  aspirin: 325 mg = 1 tab(s), Oral, Daily, 0 Refill(s)  carvedilol 25 mg oral tablet: 25 mg = 1 tab(s), Oral, BID, # 180 tab(s), 0 Refill(s)  cyclobenzaprine 10 mg oral tablet: 10 mg = 1 tab(s), Oral, TID, PRN PRN for spasm, # 30 tab(s), 0 Refill(s)  digoxin 250 mcg (0.25 mg) oral tablet: 250 mcg = 1 tab(s), Oral, Daily, # 30 tab(s), 0 Refill(s)  isosorbide MONOnitrate 30 mg oral tablet, Extended Release: 30 mg = 1 tab(s), Oral, qAM, # 30 tab(s), 0 Refill(s)  lisinopril 10 mg oral tablet: 10 mg = 1 tab(s), Oral, Daily, # 30 tab(s), 0  Refill(s)  traZODONE 50 mg oral tablet ( Desyrel ): 50 mg = 1 tab(s), Oral, Once a day (at bedtime), # 30 tab(s), 0 Refill(s).   Immunizations: Tetanus up to date, influenza, pneumococcal.      Past Medical/ Family/ Social History   Medical history:    Resolved  ATRIAL FIBRILLATION (427.31):  Resolved.  CHF (congestive heart failure) (428.0):  Resolved.  CABG - Coronary artery bypass graft (291281522):  Resolved.  HTN (hypertension) (401.9):  Resolved.  Hyperlipidemia (59995033):  Resolved.  Spinal stenosis (996419722):  Resolved.  CAD (coronary artery disease) ([id number]):  Resolved.  Neuropathy (7595102067):  Resolved.  PVD (peripheral vascular disease) ([id number]):  Resolved..   Surgical history:    Hernia Repair Incisional (Right) on 11/1/2017 at 57 Years.  Comments:  11/1/2017 14:29 - Bekah Cano RN  auto-populated from documented surgical case  Abdominal Aortic Aneurysmectomy (.) on 10/28/2017 at 57 Years.  Comments:  10/28/2017 16:04 - Pema Sorto RN  auto-populated from documented surgical case  Bypass Femoral Popliteal on 6/12/2016 at 55 Years.  Comments:  6/12/2016 13:15 - Adela Levy RN  auto-populated from documented surgical case  Embolectomy on 6/12/2016 at 55 Years.  Comments:  6/12/2016 13:15 - Adela Levy RN  auto-populated from documented surgical case  Bypass Femoral Tibial (Left) on 6/8/2016 at 55 Years.  Comments:  6/8/2016 13:30 - Jacey Martin RN  auto-populated from documented surgical case  Bypass Femoral Tibial (Left) on 2/1/2016 at 55 Years.  Comments:  2/1/2016 17:24 - Rosette Hopkins RN  auto-populated from documented surgical case  Injection Lumbar Epidural Steroid (., Back) on 12/9/2014 at 54 Years.  Comments:  12/9/2014 10:00 - Fritz TORRES, Laurel GONZALEZ  auto-populated from documented surgical case  Injection Lumbar Epidural Steroid (.) on 8/12/2014 at 53 Years.  Comments:  8/12/2014  11:23 - Emperatriz TORRES , Lorraine Mary  auto-populated from documented surgical case  Injection Lumbar Epidural Steroid (., None) on 7/3/2014 at 53 Years.  Comments:  7/3/2014 10:05 - Hal Manzo RN  auto-populated from documented surgical case  Injection Lumbar Epidural Steroid (.) on 2014 at 53 Years.  Comments:  2014 13:00 - Odalis Laguna RN  auto-populated from documented surgical case  Coronary artery bypass, using arterial graft(s) (18366) in  at 39 Years.  Appendectomy; for ruptured appendix with abscess or generalized peritonitis (32783) in  at 32 Years.  hernia on 1975 at 14 Years.  Hemorrhoidectomy (54801472).  AAA (abdominal aortic aneurysm) ([id number]).  CABG - Coronary artery bypass graft (734286926)..   Family history: Mother:  Alzheimer Disease    Father:   MI.   Social history: Alcohol use: Denies, Tobacco use: For the last 30 years, quit 3 years ago, Drug use: Denies, Occupation: On disability, Family/social situation: , intact family.      Physical Examination               Vital Signs   Vital Signs   2017 16:40 CST     Peripheral Pulse Rate     80 bpm                             Respiratory Rate          17 br/min                             SpO2                      98 %                             Oxygen Therapy            Room air    2017 15:54 CST     Temperature Oral          36.6 DegC                             Temperature Oral (calculated)             97.88 DegF                             Peripheral Pulse Rate     79 bpm                             Respiratory Rate          18 br/min                             SpO2                      97 %                             Oxygen Therapy            Room air                             Systolic Blood Pressure   115 mmHg                             Diastolic Blood Pressure  68 mmHg  .   Measurements   2017 15:54 CST     Weight Dosing             89.5 kg                              Weight Measured and Calculated in Lbs     197.31 lb                             Weight Estimated          89.5 kg                             Height/Length Estimated   182.8 cm                             Body Mass Index Estimated 26.78 kg/m2  .   Basic Oxygen Information   11/20/2017 16:40 CST     SpO2                      98 %                             Oxygen Therapy            Room air    11/20/2017 15:54 CST     SpO2                      97 %                             Oxygen Therapy            Room air  .   General:  Alert, no acute distress, white male, not anxious, not ill-appearing.    Skin:  Warm, dry, no rash, normal for ethnicity, chronic vascular changes to BLE.    Head:  Normocephalic, atraumatic.    Neck:  Supple, trachea midline, no tenderness, no JVD, no carotid bruit.    Eye:  Pupils are equal, round and reactive to light, extraocular movements are intact, Conjunctiva: Pale.    Ears, nose, mouth and throat:  Tympanic membranes clear, oral mucosa moist, no pharyngeal erythema or exudate.    Cardiovascular:  Regular rate and rhythm, Normal peripheral perfusion, No edema, Systolic murmur: brief heart murmur.    Respiratory:  Lungs are clear to auscultation, respirations are non-labored, breath sounds are equal.    Chest wall:  No tenderness, No deformity.    Back:  Nontender, Normal range of motion, Normal alignment, no step-offs.    Musculoskeletal:  Normal ROM, normal strength, no tenderness, no swelling, no deformity.    Gastrointestinal:  Soft, Nontender, Non distended, Normal bowel sounds, No organomegaly, Scars: surgical incisons with steri-strips in place that appears to be healing well.    Genitourinary:  no CVA tenderness .   Neurological:  Alert and oriented to person, place, time, and situation, No focal neurological deficit observed, CN II-XII intact, normal sensory observed, normal motor observed, normal speech observed, normal coordination observed.     Lymphatics:  No lymphadenopathy.   Psychiatric:  Cooperative, appropriate mood & affect, normal judgment, non-suicidal.       Medical Decision Making   Differential Diagnosis:  Gastrointestinal bleed, anemia.    Differential Diagnosis:  Anemia.   Documents reviewed:  Emergency department nurses' notes.   Orders  Launch Orders   Laboratory:  Type and Crossmatch 1st order (Order): 11/20/2017 16:00 CST, Stat collect, Blood, Lab Collect, Packed RBC, To Keep Ahead, 2, 11/20/2017, Print Label By Order Location  Type and Rh (Order Processing)  Antibody Screen for transfusion  (Indirect Hubert) (Order Processing)  Xmatch (Order Processing)  Red Blood Cells (Order Processing)  PT (Order): Stat collect, 11/20/2017 16:00 CST, Blood, Lab Collect, Print Label By Order Location, 11/20/2017 16:00 CST  PTT (Order): Stat collect, 11/20/2017 16:00 CST, Blood, Lab Collect, Print Label By Order Location, 11/20/2017 16:00 CST  CMP (Order): Stat collect, 11/20/2017 16:00 CST, Blood, Lab Collect, Print Label By Order Location, 11/20/2017 16:00 CST  CBC w/ Auto Diff (Order): Stat collect, 11/20/2017 16:00 CST, Blood, Lab Collect, Print Label By Order Location, 11/20/2017 16:00 CST.   Results review:  Lab results : Lab View   11/20/2017 16:57 CST     ABO/Rh                    O POS    11/20/2017 16:01 CST     Sodium Lvl                134 mmol/L  LOW                             Potassium Lvl             4.0 mmol/L                             Chloride                  100 mmol/L                             CO2                       28.0 mmol/L                             Calcium Lvl               9.0 mg/dL                             Glucose Lvl               101 mg/dL                             BUN                       8.0 mg/dL                             Creatinine                0.84 mg/dL                             eGFR-AA                   >60 mL/min/1.73 m2  NA                             eGFR-GERMAINE                  >60 mL/min/1.73  m2  NA                             Bili Total                0.4 mg/dL                             Bili Direct               0.10 mg/dL                             Bili Indirect             0.30 mg/dL                             AST                       19 unit/L                             ALT                       17 unit/L                             Alk Phos                  67 unit/L                             Total Protein             7.3 gm/dL                             Albumin Lvl               2.50 gm/dL  LOW                             Globulin                  4.80 gm/dL  HI                             A/G Ratio                 0.5 ratio  LOW                             PT                        19.0 second(s)  HI                             INR                       1.54  HI                             PTT                       49.1 second(s)  HI                             WBC                       8.0 x10(3)/mcL                             RBC                       2.93 x10(6)/mcL  LOW                             Hgb                       7.9 gm/dL  LOW                             Hct                       26.6 %  LOW                             Platelet                  332 x10(3)/mcL                             MCV                       90.8 fL                             MCH                       27.0 pg                             MCHC                      29.7 gm/dL  LOW                             RDW                       16.4 %                             MPV                       9.9 fL                             Abs Neut                  5.40 x10(3)/mcL                             Neutro Auto               68 %  NA                             Lymph Auto                16 %                             Mono Auto                 12 %  NA                             Eos Auto                  4 %  NA                             Abs Eos                   0.3 x10(3)/mcL                              Basophil Auto             1 %  NA                             Abs Neutro                5.40 x10(3)/mcL                             Abs Lymph                 1.3 x10(3)/mcL                             Abs Mono                  0.9 x10(3)/mcL                             Abs Baso                  0.0 x10(3)/mcL  .      Reexamination/ Reevaluation   Time: 11/20/2017 16:55:00 .   Assessment: Apparently the patient did not want his blood drawn by regular phlebotomy.  So is been an hour and 7 minutes and phlebotomy is just going in the room to draw the blood    unable to  get any blood out the IV that was initiated..      Procedure   Critical care note   Total time: 30 minutes spent engaged in work directly related to patient care and/ or available for direct patient care.   Critical condition(s) addressed for impending deterioration include: cardiovascular.   Associated risk factors: Anemia requiring blood transfusion.   Management: bedside assessment, supervision of care, Interpretation blood pressure, Interventions hemodynamic management, Case review (medical specialist, nursing, family), Alternate history family.   Performed by: self.      Impression and Plan   Diagnosis   Diarrhea (DHD07-HY R19.7)   Anemia (BNH44-AU D64.9)   Abnormal laboratory findings (PNED [id number])   Diagnosis   Diarrhea (QNI21-WC R19.7)   Anemia (XLO93-OB D64.9)   Abnormal laboratory findings (PNED [id number])      Calls-Consults   -  11/20/2017 18:09:00 , Adolfo LOMBARDO MD, Rodolfo Steven.    Plan   Condition: Unchanged.    Disposition: Admit time  11/20/2017 18:44:00, Place in Observation Unit.    Counseled: Patient, Family, Regarding diagnosis, Regarding diagnostic results, Regarding treatment plan, Patient indicated understanding of instructions, Family understood.    Notes: Ryan MONTEMAYOR, acted solely as a scribe for and in the presence of Dr. Malagon who performed the service.  , Ankit MONTEMAYOR  JUANCARLOS Malagon MD, a physician licensed to practice in this state, have  performed the physical evaluation,  history gathering,  and medical decision making that is reflected in this record..   MARCO A Malagon MD.

## 2022-05-03 NOTE — HISTORICAL [facility]ERNER
This is a historical note converted from Emelyn. Formatting and pictures may have been removed.  Please reference Emelyn for original formatting and attached multimedia. Chief Complaint  Left leg wound  History of Present Illness  59 yo male with a left leg fasciotomy?wound. Patient currently applying TheraSkin to wound with a graft dressing.  Review of Systems  Constitutional:?no fever, fatigue, weakness  ENMT:?no?nasal congestion/drainage  Respiratory:?no couch, no shortness of breath  Cardiovascular:?no chest pain, no palpitations, no edema  Gastrointestinal:?no nausea, vomiting  Hema/Lymph:?no abnormal bruising or bleeding  Musculoskeletal:?no muscle or joint pain, no joint swelling  Integumentary:?left leg wound  ?  ?  Physical Exam  Vitals & Measurements  T:?36.8? ?C (Oral)? HR:?83(Peripheral)? RR:?20? BP:?136/76?  HT:?182?cm? WT:?86?kg?  Incision/Wounds  ?1. Leg Left Other: open wound?- last charted: 04/17/2019 12:33  ?? ??Assessment Done By?- Wound Care Team  ?? ??Abnormality Pattern?- Gaping  ?? ??Abnormality Color?- Red  ?? ??Pressure Point?- Bony prominence  ?? ??Dressing Assessment?- Drainage present, Intact  ?? ??Dressing Activity?- Changed  ?? ??Length?- 12.0 cm  ?? ??Width?- 4.5 cm  ?? ??Depth?- 0.4 cm  ?? ??Wound Bed Tissue Type?- Erythema  ?? ??Percent Granulated?- 50 %  ?? ??Percent Other Tissue?- 50 %  ?? ??Exudate Amount?- Moderate  ?? ??Exudate Type?- Serosanguineous  ?? ??Exudate Odor?- Moderate  ?? ??Edge?- Attached to wound bed  ?? ??Surrounding Tissue Color?- Erythema  ?? ??Surrounding Tissue Condition?- Dry  ?? ??Status?-?Evolving  ?   ?????Small areas of devitalized?skeletal muscle is are curette-debrided.? Large?buds of granulation tissue are present over the surface of the wound and insinuated into the spaces beneath the tendon tissue, so that the entire wound surface is in contact with the TheraSkin.? Two?large TheraSkin grafts are applied to the wound with?the uppermost graft  being?truncated and placed over the very inferior portion of the wound.? The wounds are cemented in place with a graft dressing  ?  ?  Assessment/Plan  1.?Unspecified open wound, left lower leg, subsequent encounter  2.?Peripheral vascular disease  DO NOT CHANGE DRESSING. RTC 1 week. Clinic to order thera skin graft for next week.   Problem List/Past Medical History  Ongoing  Acute disease or injury-related malnutrition  Unspecified open wound, left lower leg, subsequent encounter  Historical  ATRIAL FIBRILLATION  CABG - Coronary artery bypass graft  CAD (coronary artery disease)  CHF (congestive heart failure)  HTN (hypertension)  Hyperlipidemia  Neuropathy  PVD (peripheral vascular disease)  Spinal stenosis  Procedure/Surgical History  Application of skin substitute graft to trunk, arms, legs, total wound surface area up to 100 sq cm; each additional 25 sq cm wound surface area, or part thereof (List separately in addition to code for primary procedure) (04/10/2019)  Application of skin substitute graft to trunk, arms, legs, total wound surface area up to 100 sq cm; each additional 25 sq cm wound surface area, or part thereof (List separately in addition to code for primary procedure) (04/10/2019)  Application of skin substitute graft to trunk, arms, legs, total wound surface area up to 100 sq cm; first 25 sq cm or less wound surface area (04/10/2019)  Surgical preparation or creation of recipient site by excision of open wounds, burn eschar, or scar (including subcutaneous tissues), or incisional release of scar contracture, trunk, arms, legs; first 100 sq cm or 1% of body area of infants and children (04/10/2019)  Theraskin, per square centimeter (04/10/2019)  Replacement of Left Lower Leg Skin with Nonautologous Tissue Substitute, Full Thickness, External Approach (04/03/2019)  Insertion of Infusion Device into Superior Vena Cava, Percutaneous Approach (11/05/2017)  Ultrasonography of Superior Vena Cava,  Guidance (11/05/2017)  Excision of Omentum, Open Approach (11/01/2017)  Hernia Repair Incisional (Right) (11/01/2017)  Repair Right Inguinal Region, Open Approach (11/01/2017)  Transfusion of Nonautologous Red Blood Cells into Peripheral Vein, Percutaneous Approach (10/30/2017)  Abdominal Aortic Aneurysmectomy (.) (10/28/2017)  Dilation of Right External Iliac Artery with Intraluminal Device, Percutaneous Approach (10/28/2017)  Extirpation of Matter from Right External Iliac Artery, Open Approach (10/28/2017)  Plain Radiography of Pelvic Arteries using Low Osmolar Contrast (10/28/2017)  Restriction of Abdominal Aorta with Intraluminal Device, Open Approach (10/28/2017)  Bypass Femoral Popliteal (06/12/2016)  Dilation of Left Femoral Artery with Intraluminal Device, Percutaneous Approach (06/12/2016)  Embolectomy (06/12/2016)  Extirpation of Matter from Left Femoral Artery, Percutaneous Approach (06/12/2016)  Fluoroscopy of Left Lower Extremity Arteries using Low Osmolar Contrast (06/12/2016)  Monitoring of Arterial Pressure, Peripheral, Percutaneous Approach (06/12/2016)  Bypass Femoral Tibial (Left) (06/08/2016)  Bypass Left Femoral Artery to Lower Extremity Artery with Nonautologous Tissue Substitute, Open Approach (06/08/2016)  Monitoring of Arterial Pressure, Peripheral, Percutaneous Approach (06/08/2016)  Debridement (eg, high pressure waterjet with/without suction, sharp selective debridement with scissors, scalpel and forceps), open wound, (eg, fibrin, devitalized epidermis and/or dermis, exudate, debris, biofilm), including topical application(s), wound (06/06/2016)  Debridement (eg, high pressure waterjet with/without suction, sharp selective debridement with scissors, scalpel and forceps), open wound, (eg, fibrin, devitalized epidermis and/or dermis, exudate, debris, biofilm), including topical application(s), wound (06/06/2016)  Debridement (eg, high pressure waterjet with/without suction, sharp selective  debridement with scissors, scalpel and forceps), open wound, (eg, fibrin, devitalized epidermis and/or dermis, exudate, debris, biofilm), including topical application(s), wound (05/31/2016)  Debridement (eg, high pressure waterjet with/without suction, sharp selective debridement with scissors, scalpel and forceps), open wound, (eg, fibrin, devitalized epidermis and/or dermis, exudate, debris, biofilm), including topical application(s), wound (05/31/2016)  Debridement (eg, high pressure waterjet with/without suction, sharp selective debridement with scissors, scalpel and forceps), open wound, (eg, fibrin, devitalized epidermis and/or dermis, exudate, debris, biofilm), including topical application(s), wound (05/31/2016)  Debridement (eg, high pressure waterjet with/without suction, sharp selective debridement with scissors, scalpel and forceps), open wound, (eg, fibrin, devitalized epidermis and/or dermis, exudate, debris, biofilm), including topical application(s), wound (05/31/2016)  Debridement (eg, high pressure waterjet with/without suction, sharp selective debridement with scissors, scalpel and forceps), open wound, (eg, fibrin, devitalized epidermis and/or dermis, exudate, debris, biofilm), including topical application(s), wound (05/31/2016)  Debridement (eg, high pressure waterjet with/without suction, sharp selective debridement with scissors, scalpel and forceps), open wound, (eg, fibrin, devitalized epidermis and/or dermis, exudate, debris, biofilm), including topical application(s), wound (05/31/2016)  Excision of Left Lower Leg Skin, External Approach (05/31/2016)  Debridement (eg, high pressure waterjet with/without suction, sharp selective debridement with scissors, scalpel and forceps), open wound, (eg, fibrin, devitalized epidermis and/or dermis, exudate, debris, biofilm), including topical application(s), wound (05/17/2016)  Debridement (eg, high pressure waterjet with/without suction, sharp  selective debridement with scissors, scalpel and forceps), open wound, (eg, fibrin, devitalized epidermis and/or dermis, exudate, debris, biofilm), including topical application(s), wound (05/17/2016)  Debridement (eg, high pressure waterjet with/without suction, sharp selective debridement with scissors, scalpel and forceps), open wound, (eg, fibrin, devitalized epidermis and/or dermis, exudate, debris, biofilm), including topical application(s), wound (05/17/2016)  Debridement (eg, high pressure waterjet with/without suction, sharp selective debridement with scissors, scalpel and forceps), open wound, (eg, fibrin, devitalized epidermis and/or dermis, exudate, debris, biofilm), including topical application(s), wound (05/17/2016)  Debridement (eg, high pressure waterjet with/without suction, sharp selective debridement with scissors, scalpel and forceps), open wound, (eg, fibrin, devitalized epidermis and/or dermis, exudate, debris, biofilm), including topical application(s), wound (05/17/2016)  Debridement (eg, high pressure waterjet with/without suction, sharp selective debridement with scissors, scalpel and forceps), open wound, (eg, fibrin, devitalized epidermis and/or dermis, exudate, debris, biofilm), including topical application(s), wound (05/17/2016)  Excision of Left Foot Skin, External Approach (05/17/2016)  Excision of Left Lower Leg Skin, External Approach (05/17/2016)  Fluoroscopy of Aorta and Bilateral Lower Extremity Arteries using Low Osmolar Contrast (03/09/2016)  Introduction of catheter, aorta (03/09/2016)  Bypass Femoral Tibial (Left) (02/01/2016)  Bypass Left Femoral Artery to Popliteal Artery with Autologous Venous Tissue, Open Approach (02/01/2016)  Excision of Right Greater Saphenous Vein, Percutaneous Endoscopic Approach (02/01/2016)  Insertion of Monitoring Device into Upper Artery, Percutaneous Approach (02/01/2016)  Monitoring of Arterial Pressure, Peripheral, Percutaneous Approach  (02/01/2016)  Injection Lumbar Epidural Steroid (., Back) (12/09/2014)  Injection of anesthetic into spinal canal for analgesia (12/09/2014)  Injection of other agent into spinal canal (12/09/2014)  Injection of steroid (12/09/2014)  Injection(s), anesthetic agent and/or steroid, transforaminal epidural, with imaging guidance (fluoroscopy or CT); lumbar or sacral, each additional level (List separately in addition to code for primary procedure). (12/09/2014)  Injection(s), anesthetic agent and/or steroid, transforaminal epidural, with imaging guidance (fluoroscopy or CT); lumbar or sacral, single level. (12/09/2014)  Other x-ray of lumbosacral spine (12/09/2014)  Injection Lumbar Epidural Steroid (.) (08/12/2014)  Injection of anesthetic into spinal canal for analgesia (08/12/2014)  Injection of other agent into spinal canal (08/12/2014)  Injection of steroid (08/12/2014)  Injection(s), anesthetic agent and/or steroid, transforaminal epidural, with imaging guidance (fluoroscopy or CT); lumbar or sacral, single level. (08/12/2014)  Other x-ray of lumbosacral spine (08/12/2014)  Injection Lumbar Epidural Steroid (., None) (07/03/2014)  Injection of anesthetic into spinal canal for analgesia (07/03/2014)  Injection of other agent into spinal canal (07/03/2014)  Injection of steroid (07/03/2014)  Injection(s), anesthetic agent and/or steroid, transforaminal epidural, with imaging guidance (fluoroscopy or CT); lumbar or sacral, single level. (07/03/2014)  Other x-ray of lumbosacral spine (07/03/2014)  Injection Lumbar Epidural Steroid (.) (04/01/2014)  Injection of anesthetic into spinal canal for analgesia (04/01/2014)  Injection of other agent into spinal canal (04/01/2014)  Injection of steroid (04/01/2014)  Injection(s), anesthetic agent and/or steroid, transforaminal epidural, with imaging guidance (fluoroscopy or CT); lumbar or sacral, single level. (04/01/2014)  Other x-ray of lumbosacral spine  (04/01/2014)  Coronary artery bypass, using arterial graft(s) (1999)  Appendectomy; for ruptured appendix with abscess or generalized peritonitis (1992)  hernia (08/06/1975)  AAA (abdominal aortic aneurysm)  Hemorrhoidectomy   Medications  amoxicillin-clavulanate 875 mg-125 mg oral tablet, 1 tab(s), Oral, BID  carvedilol 25 mg oral tablet, 25 mg= 1 tab(s), Oral, BID  ciprofloxacin 500 mg oral tablet, 500 mg= 1 tab(s), Oral, BID,? ?Not Taking per Prescriber  clindamycin 150 mg oral capsule, 300 mg= 2 cap(s), Oral, QID  clindamycin 300 mg oral capsule, 300 mg= 1 cap(s), Oral, TID  cyclobenzaprine 10 mg oral tablet, 10 mg= 1 tab(s), Oral, TID, PRN  digoxin 125 mcg (0.125 mg) oral tablet, 125 mcg= 1 tab(s), Oral, Daily  digoxin 250 mcg (0.25 mg) oral tablet, 250 mcg= 1 tab(s), Oral, Daily  Diltia  mg/24 hours oral capsule, extended release, 240 mg= 1 cap(s), Oral, Daily  finasteride 5 mg oral tablet, 5 mg= 1 tab(s), Oral, Daily  isosorbide MONOnitrate 30 mg oral tablet, Extended Release, 30 mg= 1 tab(s), Oral, qAM,? ?Not Taking per Prescriber  lisinopril 10 mg oral tablet, 10 mg= 1 tab(s), Oral, Daily  Livalo, 2 mg, Oral, Daily  Livalo 4 mg oral tablet, 4 mg= 1 tab(s), Oral, qPM  morphine 30 mg/8 hr oral tablet, extended release, 30 mg= 1 tab(s), Oral, BID,? ?Not Taking per Prescriber  Pantoprazole 40 mg ORAL EC-Tablet, 40 mg= 1 tab(s), Oral, Daily  traZODONE 50 mg oral tablet ( Desyrel ), 50 mg= 1 tab(s), Oral, qPM  Zosyn, 3.375 gm, IV Piggyback, TID,? 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 Activity level: Moderate physical work. Highest education level: Some college. Operates hazardous equipment: No., 08/07/2013  Exercise - Does not exercise, 08/07/2013  Home/Environment  Lives with Spouse. Living situation: Home/Independent.  Alcohol abuse in household: Yes. Substance abuse in household: No. Smoker in household: Yes., 08/07/2013  Nutrition/Health  Type of diet: 2 meals. Regular, Caffeine intake amount: 2-3 cups of coffee.. Sleeping concerns: Yes. Feels highly stressed: Yes., 08/07/2013  Sexual  Sexually active: No. Number of current partners 1. Sexual orientation: Heterosexual., 08/07/2013  Substance Abuse - Denies Substance Abuse, 08/07/2013  Tobacco - High Risk, 08/07/2013  Former smoker, quit more than 30 days ago, Yes, 03/27/2019  Former smoker, 11/20/2017  Former smoker, 06/08/2016  Immunizations  Vaccine Date Status   influenza virus vaccine, inactivated 11/02/2017 Given   Health Maintenance  Health Maintenance  ???Pending?(in the next year)  ??? ??OverDue  ??? ? ? ?Coronary Artery Disease Maintenance-Lipid Lowering Therapy due??and every?  ??? ? ? ?Diabetes Screening due??and every?  ??? ? ? ?Obesity Screening due??11/05/18??and every 1??year(s)  ??? ? ? ?Aspirin Therapy for CVD Prevention due??11/06/18??and every 1??year(s)  ??? ? ? ?HF-Heart Failure Education due??11/21/18??and every 1??year(s)  ??? ? ? ?ADL Screening due??11/21/18??and every 1??year(s)  ??? ??Due?  ??? ? ? ?Alcohol Misuse Screening due??04/17/19??and every 1??year(s)  ??? ? ? ?Depression Screening due??04/17/19??and every?  ??? ? ? ?Tetanus Vaccine due??04/17/19??and every 10??year(s)  ??? ? ? ?Zoster Vaccine due??04/17/19??and every 100??year(s)  ??? ??Due In Future?  ??? ? ? ?HF-LVEF not due until??12/06/19??and every 2??year(s)  ??? ? ? ?Hypertension Management-BMP not due until??01/07/20??and every 1??year(s)  ??? ? ? ?Blood Pressure Screening not due until??04/09/20??and every 1??year(s)  ??? ? ? ?Body Mass Index Check not due until??04/09/20??and every 1??year(s)  ??? ? ? ?Hypertension Management-Blood Pressure not due until??04/09/20??and every 1??year(s)  ???Satisfied?(in the past 1 year)  ??? ??Satisfied?  ??? ? ? ?Blood Pressure Screening  on??04/17/19.??Satisfied by Jackie Terrazas RN  ??? ? ? ?Hypertension Management-Blood Pressure on??04/17/19.??Satisfied by Jackie Terrazas RN  ?  ?

## 2022-05-03 NOTE — HISTORICAL [facility] CERNER
This is a historical note converted from Emelyn. Formatting and pictures may have been removed.  Please reference Emelyn for original formatting and attached multimedia. Chief Complaint  left LE wound  History of Present Illness  58 year old male with left LE wound, with a very complex vascular surgical history which included deterioration of an aortic stent graft? due to infection and?who has been placed on life-long Augmentin therapy.? The stent graft, placed by Dr. Arun Ricketts, was removed after exta-anatomic bypass, a right axillo-bifemoral/Fem-AT bypass.? The left limb of that bypass thrombosed.? This was followed by a thoracic aorta-biprofunda bypass.? The patient had fasciotomy of the left leg with a resulting large wound on the lateral left leg which has been treated up until today with NPWT.? He is referred here for management of this wound.? Dr Emery and Dr. Mata have also been involved in his care, but his latest procedure was done at the [facility] in [place], [place], by Dr. Monica Rubio.  Review of Systems  Constitutional:?no fever, fatigue, weakness  ENMT:?no nasal congestion/drainage  Respiratory:?no shortness of breath  Cardiovascular:?no chest pain, no edema  Gastrointestinal:?no nausea, vomiting  Hema/Lymph:?no abnormal bruising or bleeding  Musculoskeletal:?no muscle or joint pain, no joint swelling  Integumentary:?left LE wound  ?  ?  Physical Exam  Vitals & Measurements  BP:?156/69?  HT:?182?cm? WT:?86?kg?  Incision/Wounds  ?1. Leg Left Other: open wound?- last charted: 03/27/2019 08:59  ?? ??Assessment Done By?- Nurse  ?? ??Dressing Type?- Wound vac foam, black  ?? ??Dressing Assessment?- Drainage present  ?? ??Dressing Activity?- Changed  ?? ??Cleansing?- Commercial cleansing solution  ?? ??Length?- 13.0 cm  ?? ??Width?- 5.0 cm  ?? ??Depth?- 0.2 cm  ?? ??Wound Bed Tissue Type?- Granulating  ?? ??Percent Granulated?- 60 %  ?? ??Exudate Amount?- Moderate  ?? ??Exudate Type?-  Serous  ?   ????Except for a fairly large?elliptical skin island in the center of the wound, the wound is covered with beefy red granulation tissue?which is flush with the skin surface.  ?  ?  Assessment/Plan  1.?CAD (coronary artery disease)  2.?Open wound of left lower leg  3. Severe arteriorsclerotic peripheral vascular disease (I73.9)  silver?alginate Ag?with optilock, kerlix, and coban loosely daily follow up in one week. home health to use Drawtex instead of optilock  ?  I think Mr. Tiwari is a candidate for TheraSkin grafting, for which we will seek authorization.? Apparently, he is at maximum benefit at this time so far as his LLE arterial circulation is concerned. ?His vascular surgeon has told him there is nothing more he can do to improve the LLE arterial circulation.   Problem List/Past Medical History  Ongoing  Acute disease or injury-related malnutrition  Historical  ATRIAL FIBRILLATION  CABG - Coronary artery bypass graft  CAD (coronary artery disease)  CHF (congestive heart failure)  HTN (hypertension)  Hyperlipidemia  Neuropathy  PVD (peripheral vascular disease)  Spinal stenosis  Procedure/Surgical History  Insertion of Infusion Device into Superior Vena Cava, Percutaneous Approach (11/05/2017)  Ultrasonography of Superior Vena Cava, Guidance (11/05/2017)  Excision of Omentum, Open Approach (11/01/2017)  Hernia Repair Incisional (Right) (11/01/2017)  Repair Right Inguinal Region, Open Approach (11/01/2017)  Transfusion of Nonautologous Red Blood Cells into Peripheral Vein, Percutaneous Approach (10/30/2017)  Abdominal Aortic Aneurysmectomy (.) (10/28/2017)  Dilation of Right External Iliac Artery with Intraluminal Device, Percutaneous Approach (10/28/2017)  Extirpation of Matter from Right External Iliac Artery, Open Approach (10/28/2017)  Plain Radiography of Pelvic Arteries using Low Osmolar Contrast (10/28/2017)  Restriction of Abdominal Aorta with Intraluminal Device, Open Approach  (10/28/2017)  Bypass Femoral Popliteal (06/12/2016)  Dilation of Left Femoral Artery with Intraluminal Device, Percutaneous Approach (06/12/2016)  Embolectomy (06/12/2016)  Extirpation of Matter from Left Femoral Artery, Percutaneous Approach (06/12/2016)  Fluoroscopy of Left Lower Extremity Arteries using Low Osmolar Contrast (06/12/2016)  Monitoring of Arterial Pressure, Peripheral, Percutaneous Approach (06/12/2016)  Bypass Femoral Tibial (Left) (06/08/2016)  Bypass Left Femoral Artery to Lower Extremity Artery with Nonautologous Tissue Substitute, Open Approach (06/08/2016)  Monitoring of Arterial Pressure, Peripheral, Percutaneous Approach (06/08/2016)  Debridement (eg, high pressure waterjet with/without suction, sharp selective debridement with scissors, scalpel and forceps), open wound, (eg, fibrin, devitalized epidermis and/or dermis, exudate, debris, biofilm), including topical application(s), wound (06/06/2016)  Debridement (eg, high pressure waterjet with/without suction, sharp selective debridement with scissors, scalpel and forceps), open wound, (eg, fibrin, devitalized epidermis and/or dermis, exudate, debris, biofilm), including topical application(s), wound (06/06/2016)  Debridement (eg, high pressure waterjet with/without suction, sharp selective debridement with scissors, scalpel and forceps), open wound, (eg, fibrin, devitalized epidermis and/or dermis, exudate, debris, biofilm), including topical application(s), wound (05/31/2016)  Debridement (eg, high pressure waterjet with/without suction, sharp selective debridement with scissors, scalpel and forceps), open wound, (eg, fibrin, devitalized epidermis and/or dermis, exudate, debris, biofilm), including topical application(s), wound (05/31/2016)  Debridement (eg, high pressure waterjet with/without suction, sharp selective debridement with scissors, scalpel and forceps), open wound, (eg, fibrin, devitalized epidermis and/or dermis, exudate,  debris, biofilm), including topical application(s), wound (05/31/2016)  Debridement (eg, high pressure waterjet with/without suction, sharp selective debridement with scissors, scalpel and forceps), open wound, (eg, fibrin, devitalized epidermis and/or dermis, exudate, debris, biofilm), including topical application(s), wound (05/31/2016)  Debridement (eg, high pressure waterjet with/without suction, sharp selective debridement with scissors, scalpel and forceps), open wound, (eg, fibrin, devitalized epidermis and/or dermis, exudate, debris, biofilm), including topical application(s), wound (05/31/2016)  Debridement (eg, high pressure waterjet with/without suction, sharp selective debridement with scissors, scalpel and forceps), open wound, (eg, fibrin, devitalized epidermis and/or dermis, exudate, debris, biofilm), including topical application(s), wound (05/31/2016)  Excision of Left Lower Leg Skin, External Approach (05/31/2016)  Debridement (eg, high pressure waterjet with/without suction, sharp selective debridement with scissors, scalpel and forceps), open wound, (eg, fibrin, devitalized epidermis and/or dermis, exudate, debris, biofilm), including topical application(s), wound (05/17/2016)  Debridement (eg, high pressure waterjet with/without suction, sharp selective debridement with scissors, scalpel and forceps), open wound, (eg, fibrin, devitalized epidermis and/or dermis, exudate, debris, biofilm), including topical application(s), wound (05/17/2016)  Debridement (eg, high pressure waterjet with/without suction, sharp selective debridement with scissors, scalpel and forceps), open wound, (eg, fibrin, devitalized epidermis and/or dermis, exudate, debris, biofilm), including topical application(s), wound (05/17/2016)  Debridement (eg, high pressure waterjet with/without suction, sharp selective debridement with scissors, scalpel and forceps), open wound, (eg, fibrin, devitalized epidermis and/or dermis,  exudate, debris, biofilm), including topical application(s), wound (05/17/2016)  Debridement (eg, high pressure waterjet with/without suction, sharp selective debridement with scissors, scalpel and forceps), open wound, (eg, fibrin, devitalized epidermis and/or dermis, exudate, debris, biofilm), including topical application(s), wound (05/17/2016)  Debridement (eg, high pressure waterjet with/without suction, sharp selective debridement with scissors, scalpel and forceps), open wound, (eg, fibrin, devitalized epidermis and/or dermis, exudate, debris, biofilm), including topical application(s), wound (05/17/2016)  Excision of Left Foot Skin, External Approach (05/17/2016)  Excision of Left Lower Leg Skin, External Approach (05/17/2016)  Fluoroscopy of Aorta and Bilateral Lower Extremity Arteries using Low Osmolar Contrast (03/09/2016)  Introduction of catheter, aorta (03/09/2016)  Bypass Femoral Tibial (Left) (02/01/2016)  Bypass Left Femoral Artery to Popliteal Artery with Autologous Venous Tissue, Open Approach (02/01/2016)  Excision of Right Greater Saphenous Vein, Percutaneous Endoscopic Approach (02/01/2016)  Insertion of Monitoring Device into Upper Artery, Percutaneous Approach (02/01/2016)  Monitoring of Arterial Pressure, Peripheral, Percutaneous Approach (02/01/2016)  Injection Lumbar Epidural Steroid (., Back) (12/09/2014)  Injection of anesthetic into spinal canal for analgesia (12/09/2014)  Injection of other agent into spinal canal (12/09/2014)  Injection of steroid (12/09/2014)  Injection(s), anesthetic agent and/or steroid, transforaminal epidural, with imaging guidance (fluoroscopy or CT); lumbar or sacral, each additional level (List separately in addition to code for primary procedure). (12/09/2014)  Injection(s), anesthetic agent and/or steroid, transforaminal epidural, with imaging guidance (fluoroscopy or CT); lumbar or sacral, single level. (12/09/2014)  Other x-ray of lumbosacral spine  (12/09/2014)  Injection Lumbar Epidural Steroid (.) (08/12/2014)  Injection of anesthetic into spinal canal for analgesia (08/12/2014)  Injection of other agent into spinal canal (08/12/2014)  Injection of steroid (08/12/2014)  Injection(s), anesthetic agent and/or steroid, transforaminal epidural, with imaging guidance (fluoroscopy or CT); lumbar or sacral, single level. (08/12/2014)  Other x-ray of lumbosacral spine (08/12/2014)  Injection Lumbar Epidural Steroid (., None) (07/03/2014)  Injection of anesthetic into spinal canal for analgesia (07/03/2014)  Injection of other agent into spinal canal (07/03/2014)  Injection of steroid (07/03/2014)  Injection(s), anesthetic agent and/or steroid, transforaminal epidural, with imaging guidance (fluoroscopy or CT); lumbar or sacral, single level. (07/03/2014)  Other x-ray of lumbosacral spine (07/03/2014)  Injection Lumbar Epidural Steroid (.) (04/01/2014)  Injection of anesthetic into spinal canal for analgesia (04/01/2014)  Injection of other agent into spinal canal (04/01/2014)  Injection of steroid (04/01/2014)  Injection(s), anesthetic agent and/or steroid, transforaminal epidural, with imaging guidance (fluoroscopy or CT); lumbar or sacral, single level. (04/01/2014)  Other x-ray of lumbosacral spine (04/01/2014)  Coronary artery bypass, using arterial graft(s) (1999)  Appendectomy; for ruptured appendix with abscess or generalized peritonitis (1992)  hernia (08/06/1975)  AAA (abdominal aortic aneurysm)  CABG - Coronary artery bypass graft  Hemorrhoidectomy   Medications  amoxicillin-clavulanate 875 mg-125 mg oral tablet, 1 tab(s), Oral, BID  carvedilol 25 mg oral tablet, 25 mg= 1 tab(s), Oral, BID  clindamycin 300 mg oral capsule, 300 mg= 1 cap(s), Oral, TID  cyclobenzaprine 10 mg oral tablet, 10 mg= 1 tab(s), Oral, TID, PRN  digoxin 250 mcg (0.25 mg) oral tablet, 250 mcg= 1 tab(s), Oral, Daily  Diltia  mg/24 hours oral capsule, extended release, 240 mg= 1  cap(s), Oral, Daily  finasteride 5 mg oral tablet, 5 mg= 1 tab(s), Oral, Daily  Hydrocodone/Apap 10/325, Oral, QID, PRN,? ?Still taking, not as prescribed: taking 5 times a day  isosorbide MONOnitrate 30 mg oral tablet, Extended Release, 30 mg= 1 tab(s), Oral, qAM,? ?Not Taking per Prescriber  lisinopril 10 mg oral tablet, 10 mg= 1 tab(s), Oral, Daily  Livalo, 2 mg, Oral, Daily  Pantoprazole 40 mg ORAL EC-Tablet, 40 mg= 1 tab(s), Oral, Daily  traZODONE 50 mg oral tablet ( Desyrel ), 50 mg= 1 tab(s), Oral, qPM  Xarelto 20mg Tablet, 20 mg= 1 tab(s), Oral, Once  Zosyn, 3.375 gm, IV Piggyback, TID,? 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 Activity level: Moderate physical work. Highest education level: Some college. Operates hazardous equipment: No., 08/07/2013  Exercise - Does not exercise, 08/07/2013  Home/Environment  Lives with Spouse. Living situation: Home/Independent. Alcohol abuse in household: Yes. Substance abuse in household: No. Smoker in household: Yes., 08/07/2013  Nutrition/Health  Type of diet: 2 meals. Regular, Caffeine intake amount: 2-3 cups of coffee.. Sleeping concerns: Yes. Feels highly stressed: Yes., 08/07/2013  Sexual  Sexually active: No. Number of current partners 1. Sexual orientation: Heterosexual., 08/07/2013  Substance Abuse - Denies Substance Abuse, 08/07/2013  Tobacco - High Risk, 08/07/2013  Former smoker, quit more than 30 days ago, Yes, 03/27/2019  Former smoker, 11/20/2017  Former smoker, 06/08/2016  Immunizations  Vaccine Date Status   influenza virus vaccine, inactivated 11/02/2017 Given   Health Maintenance  Health Maintenance  ???Pending?(in the next year)  ??? ??OverDue  ??? ? ? ?Coronary Artery Disease Maintenance-Lipid Lowering Therapy due??and every?  ??? ? ? ?Diabetes Screening due??and  every?  ??? ? ? ?Blood Pressure Screening due??07/17/18??and every 1??year(s)  ??? ? ? ?Hypertension Management-Blood Pressure due??07/17/18??and every 1??year(s)  ??? ? ? ?Obesity Screening due??11/05/18??and every 1??year(s)  ??? ? ? ?Aspirin Therapy for CVD Prevention due??11/06/18??and every 1??year(s)  ??? ? ? ?HF-Heart Failure Education due??11/21/18??and every 1??year(s)  ??? ? ? ?ADL Screening due??11/21/18??and every 1??year(s)  ??? ? ? ?Body Mass Index Check due??12/04/18??and every 1??year(s)  ??? ??Due?  ??? ? ? ?Alcohol Misuse Screening due??03/27/19??and every 1??year(s)  ??? ? ? ?Depression Screening due??03/27/19??and every?  ??? ? ? ?Tetanus Vaccine due??03/27/19??and every 10??year(s)  ??? ? ? ?Zoster Vaccine due??03/27/19??and every 100??year(s)  ??? ??Due In Future?  ??? ? ? ?Hypertension Management-BMP not due until??07/10/19??and every 1??year(s)  ??? ? ? ?Smoking Cessation (Coronary Artery Disease) not due until??12/05/19??and every 2??year(s)  ??? ? ? ?HF-LVEF not due until??12/06/19??and every 2??year(s)  ???Satisfied?(in the past 1 year)  ??? ??Satisfied?  ??? ? ? ?Blood Pressure Screening on??03/27/19.??Satisfied by Corinne Caraballo  ??? ? ? ?Hypertension Management-Blood Pressure on??03/27/19.??Satisfied by Corinne Caraballo  ?  ?

## 2022-05-03 NOTE — HISTORICAL [facility]ERNER
This is a historical note converted from Emelyn. Formatting and pictures may have been removed.  Please reference Emelyn for original formatting and attached multimedia. Chief Complaint  Left leg wound  History of Present Illness  59 yo male with a left leg open?wound, following anterior compartment fasciotomy performed after emergency revascularization of the left lower extremity. The patients left foot is warm, with good color.  Review of Systems  Constitutional:?no fever, fatigue, weakness  ENMT: no?nasal congestion/drainage  Respiratory:?no shortness of breath  Cardiovascular:?no chest pain, no edema  Gastrointestinal:?no nausea, vomiting  Hema/Lymph:?no abnormal bruising or bleeding  Musculoskeletal:?no muscle or joint pain, no joint swelling  Integumentary: Left leg wound  ?  ?  Physical Exam  Vitals & Measurements  T:?36.8? ?C (Oral)? HR:?82(Peripheral)? RR:?20? BP:?145/73?  HT:?182?cm? WT:?86?kg?  Incision/Wounds  ?1. Leg Left Other: open wound?- last charted: 05/01/2019 12:00  ?? ??Assessment Done By?- Wound Care Team  ?? ??Dressing Type?- Gauze dressing  ?? ??Dressing Assessment?- Drainage present, Intact  ?? ??Dressing Activity?- Removed  ?? ??Cleansing?- Cleaned with normal saline  ?? ??Length?- 10.5 cm  ?? ??Width?- 3.9 cm  ?? ??Depth?- 0.7 cm  ?? ??Wound Bed Tissue Type?- Granulating, Necrotic tissue, slough  ?? ??Percent Granulated?- 80 %  ?? ??Percent Other Tissue?- 20 %  ?? ??Exudate Amount?- Moderate  ?? ??Exudate Type?- Serous  ?? ??Exudate Odor?- None  ?? ??Edge?- Attached to wound bed  ?? ??Status?- Improving  ?   ???? Fibrinous slough curette-debrided from the wound beneath exposed tendons.? Wound margin treated ?cirfumferentially with silver nitrate. The wound is covered with?one large?TheraSkin graft, cut in half, so that each half segment is stretched to?cover?the upper half and the lower halves of the wound, respectively.? The wound is dressed with a graft  dressing.  ?  ?  Assessment/Plan  1.?Unspecified open wound, left lower leg, subsequent encounter?S81.802D  2.?History of fasciotomy?Z98.890  3.?Atherosclerotic peripheral vascular disease?I70.209  ?The patient is taking Augmentin, clindamycin, and Cipro. DO NOT CHANGE DRESSING. Clinic to order another application of theraskin for next week. RTC 1 week   Problem List/Past Medical History  Ongoing  Acute disease or injury-related malnutrition  History of fasciotomy  Unspecified open wound, left lower leg, subsequent encounter  Historical  ATRIAL FIBRILLATION  CABG - Coronary artery bypass graft  CAD (coronary artery disease)  CHF (congestive heart failure)  HTN (hypertension)  Hyperlipidemia  Neuropathy  PVD (peripheral vascular disease)  Spinal stenosis  Procedure/Surgical History  Application of skin substitute graft to trunk, arms, legs, total wound surface area up to 100 sq cm; each additional 25 sq cm wound surface area, or part thereof (List separately in addition to code for primary procedure) (04/17/2019)  Application of skin substitute graft to trunk, arms, legs, total wound surface area up to 100 sq cm; each additional 25 sq cm wound surface area, or part thereof (List separately in addition to code for primary procedure) (04/17/2019)  Application of skin substitute graft to trunk, arms, legs, total wound surface area up to 100 sq cm; first 25 sq cm or less wound surface area (04/17/2019)  Replacement of Left Lower Leg Skin with Nonautologous Tissue Substitute, Full Thickness, External Approach (04/17/2019)  Theraskin, per square centimeter (04/17/2019)  Application of skin substitute graft to trunk, arms, legs, total wound surface area up to 100 sq cm; each additional 25 sq cm wound surface area, or part thereof (List separately in addition to code for primary procedure) (04/10/2019)  Application of skin substitute graft to trunk, arms, legs, total wound surface area up to 100 sq cm; each additional 25 sq  cm wound surface area, or part thereof (List separately in addition to code for primary procedure) (04/10/2019)  Application of skin substitute graft to trunk, arms, legs, total wound surface area up to 100 sq cm; each additional 25 sq cm wound surface area, or part thereof (List separately in addition to code for primary procedure) (04/10/2019)  Application of skin substitute graft to trunk, arms, legs, total wound surface area up to 100 sq cm; first 25 sq cm or less wound surface area (04/10/2019)  Application of skin substitute graft to trunk, arms, legs, total wound surface area up to 100 sq cm; first 25 sq cm or less wound surface area (04/10/2019)  Replacement of Left Lower Leg Skin with Nonautologous Tissue Substitute, Full Thickness, External Approach (04/10/2019)  Surgical preparation or creation of recipient site by excision of open wounds, burn eschar, or scar (including subcutaneous tissues), or incisional release of scar contracture, trunk, arms, legs; first 100 sq cm or 1% of body area of infants and children (04/10/2019)  Theraskin, per square centimeter (04/10/2019)  Theraskin, per square centimeter (04/10/2019)  Replacement of Left Lower Leg Skin with Nonautologous Tissue Substitute, Full Thickness, External Approach (04/03/2019)  Insertion of Infusion Device into Superior Vena Cava, Percutaneous Approach (11/05/2017)  Ultrasonography of Superior Vena Cava, Guidance (11/05/2017)  Excision of Omentum, Open Approach (11/01/2017)  Hernia Repair Incisional (Right) (11/01/2017)  Repair Right Inguinal Region, Open Approach (11/01/2017)  Transfusion of Nonautologous Red Blood Cells into Peripheral Vein, Percutaneous Approach (10/30/2017)  Abdominal Aortic Aneurysmectomy (.) (10/28/2017)  Dilation of Right External Iliac Artery with Intraluminal Device, Percutaneous Approach (10/28/2017)  Extirpation of Matter from Right External Iliac Artery, Open Approach (10/28/2017)  Plain Radiography of Pelvic Arteries  using Low Osmolar Contrast (10/28/2017)  Restriction of Abdominal Aorta with Intraluminal Device, Open Approach (10/28/2017)  Bypass Femoral Popliteal (06/12/2016)  Dilation of Left Femoral Artery with Intraluminal Device, Percutaneous Approach (06/12/2016)  Embolectomy (06/12/2016)  Extirpation of Matter from Left Femoral Artery, Percutaneous Approach (06/12/2016)  Fluoroscopy of Left Lower Extremity Arteries using Low Osmolar Contrast (06/12/2016)  Monitoring of Arterial Pressure, Peripheral, Percutaneous Approach (06/12/2016)  Bypass Femoral Tibial (Left) (06/08/2016)  Bypass Left Femoral Artery to Lower Extremity Artery with Nonautologous Tissue Substitute, Open Approach (06/08/2016)  Monitoring of Arterial Pressure, Peripheral, Percutaneous Approach (06/08/2016)  Debridement (eg, high pressure waterjet with/without suction, sharp selective debridement with scissors, scalpel and forceps), open wound, (eg, fibrin, devitalized epidermis and/or dermis, exudate, debris, biofilm), including topical application(s), wound (06/06/2016)  Debridement (eg, high pressure waterjet with/without suction, sharp selective debridement with scissors, scalpel and forceps), open wound, (eg, fibrin, devitalized epidermis and/or dermis, exudate, debris, biofilm), including topical application(s), wound (06/06/2016)  Debridement (eg, high pressure waterjet with/without suction, sharp selective debridement with scissors, scalpel and forceps), open wound, (eg, fibrin, devitalized epidermis and/or dermis, exudate, debris, biofilm), including topical application(s), wound (05/31/2016)  Debridement (eg, high pressure waterjet with/without suction, sharp selective debridement with scissors, scalpel and forceps), open wound, (eg, fibrin, devitalized epidermis and/or dermis, exudate, debris, biofilm), including topical application(s), wound (05/31/2016)  Debridement (eg, high pressure waterjet with/without suction, sharp selective debridement  with scissors, scalpel and forceps), open wound, (eg, fibrin, devitalized epidermis and/or dermis, exudate, debris, biofilm), including topical application(s), wound (05/31/2016)  Debridement (eg, high pressure waterjet with/without suction, sharp selective debridement with scissors, scalpel and forceps), open wound, (eg, fibrin, devitalized epidermis and/or dermis, exudate, debris, biofilm), including topical application(s), wound (05/31/2016)  Debridement (eg, high pressure waterjet with/without suction, sharp selective debridement with scissors, scalpel and forceps), open wound, (eg, fibrin, devitalized epidermis and/or dermis, exudate, debris, biofilm), including topical application(s), wound (05/31/2016)  Debridement (eg, high pressure waterjet with/without suction, sharp selective debridement with scissors, scalpel and forceps), open wound, (eg, fibrin, devitalized epidermis and/or dermis, exudate, debris, biofilm), including topical application(s), wound (05/31/2016)  Excision of Left Lower Leg Skin, External Approach (05/31/2016)  Debridement (eg, high pressure waterjet with/without suction, sharp selective debridement with scissors, scalpel and forceps), open wound, (eg, fibrin, devitalized epidermis and/or dermis, exudate, debris, biofilm), including topical application(s), wound (05/17/2016)  Debridement (eg, high pressure waterjet with/without suction, sharp selective debridement with scissors, scalpel and forceps), open wound, (eg, fibrin, devitalized epidermis and/or dermis, exudate, debris, biofilm), including topical application(s), wound (05/17/2016)  Debridement (eg, high pressure waterjet with/without suction, sharp selective debridement with scissors, scalpel and forceps), open wound, (eg, fibrin, devitalized epidermis and/or dermis, exudate, debris, biofilm), including topical application(s), wound (05/17/2016)  Debridement (eg, high pressure waterjet with/without suction, sharp selective  debridement with scissors, scalpel and forceps), open wound, (eg, fibrin, devitalized epidermis and/or dermis, exudate, debris, biofilm), including topical application(s), wound (05/17/2016)  Debridement (eg, high pressure waterjet with/without suction, sharp selective debridement with scissors, scalpel and forceps), open wound, (eg, fibrin, devitalized epidermis and/or dermis, exudate, debris, biofilm), including topical application(s), wound (05/17/2016)  Debridement (eg, high pressure waterjet with/without suction, sharp selective debridement with scissors, scalpel and forceps), open wound, (eg, fibrin, devitalized epidermis and/or dermis, exudate, debris, biofilm), including topical application(s), wound (05/17/2016)  Excision of Left Foot Skin, External Approach (05/17/2016)  Excision of Left Lower Leg Skin, External Approach (05/17/2016)  Fluoroscopy of Aorta and Bilateral Lower Extremity Arteries using Low Osmolar Contrast (03/09/2016)  Introduction of catheter, aorta (03/09/2016)  Bypass Femoral Tibial (Left) (02/01/2016)  Bypass Left Femoral Artery to Popliteal Artery with Autologous Venous Tissue, Open Approach (02/01/2016)  Excision of Right Greater Saphenous Vein, Percutaneous Endoscopic Approach (02/01/2016)  Insertion of Monitoring Device into Upper Artery, Percutaneous Approach (02/01/2016)  Monitoring of Arterial Pressure, Peripheral, Percutaneous Approach (02/01/2016)  Injection Lumbar Epidural Steroid (., Back) (12/09/2014)  Injection of anesthetic into spinal canal for analgesia (12/09/2014)  Injection of other agent into spinal canal (12/09/2014)  Injection of steroid (12/09/2014)  Injection(s), anesthetic agent and/or steroid, transforaminal epidural, with imaging guidance (fluoroscopy or CT); lumbar or sacral, each additional level (List separately in addition to code for primary procedure). (12/09/2014)  Injection(s), anesthetic agent and/or steroid, transforaminal epidural, with imaging  guidance (fluoroscopy or CT); lumbar or sacral, single level. (12/09/2014)  Other x-ray of lumbosacral spine (12/09/2014)  Injection Lumbar Epidural Steroid (.) (08/12/2014)  Injection of anesthetic into spinal canal for analgesia (08/12/2014)  Injection of other agent into spinal canal (08/12/2014)  Injection of steroid (08/12/2014)  Injection(s), anesthetic agent and/or steroid, transforaminal epidural, with imaging guidance (fluoroscopy or CT); lumbar or sacral, single level. (08/12/2014)  Other x-ray of lumbosacral spine (08/12/2014)  Injection Lumbar Epidural Steroid (., None) (07/03/2014)  Injection of anesthetic into spinal canal for analgesia (07/03/2014)  Injection of other agent into spinal canal (07/03/2014)  Injection of steroid (07/03/2014)  Injection(s), anesthetic agent and/or steroid, transforaminal epidural, with imaging guidance (fluoroscopy or CT); lumbar or sacral, single level. (07/03/2014)  Other x-ray of lumbosacral spine (07/03/2014)  Injection Lumbar Epidural Steroid (.) (04/01/2014)  Injection of anesthetic into spinal canal for analgesia (04/01/2014)  Injection of other agent into spinal canal (04/01/2014)  Injection of steroid (04/01/2014)  Injection(s), anesthetic agent and/or steroid, transforaminal epidural, with imaging guidance (fluoroscopy or CT); lumbar or sacral, single level. (04/01/2014)  Other x-ray of lumbosacral spine (04/01/2014)  Coronary artery bypass, using arterial graft(s) (1999)  Appendectomy; for ruptured appendix with abscess or generalized peritonitis (1992)  hernia (08/06/1975)  AAA (abdominal aortic aneurysm)  CABG - Coronary artery bypass graft  Hemorrhoidectomy   Medications  amoxicillin-clavulanate 875 mg-125 mg oral tablet, 1 tab(s), Oral, BID  carvedilol 25 mg oral tablet, 25 mg= 1 tab(s), Oral, BID  ciprofloxacin 500 mg oral tablet, 500 mg= 1 tab(s), Oral, q12hr, 5 refills  clindamycin 150 mg oral capsule, 300 mg= 2 cap(s), Oral, QID  clindamycin 300 mg oral  capsule, 300 mg= 1 cap(s), Oral, QID, 5 refills  cyclobenzaprine 10 mg oral tablet, 10 mg= 1 tab(s), Oral, TID, PRN  digoxin 125 mcg (0.125 mg) oral tablet, 125 mcg= 1 tab(s), Oral, Daily  digoxin 250 mcg (0.25 mg) oral tablet, 250 mcg= 1 tab(s), Oral, Daily  Diltia  mg/24 hours oral capsule, extended release, 240 mg= 1 cap(s), Oral, Daily  finasteride 5 mg oral tablet, 5 mg= 1 tab(s), Oral, Daily  Hydrocodone/Apap 10/325, Oral, QID, PRN,? ?Still taking, not as prescribed: taking 5 times a day  isosorbide MONOnitrate 30 mg oral tablet, Extended Release, 30 mg= 1 tab(s), Oral, qAM,? ?Not Taking per Prescriber  lisinopril 10 mg oral tablet, 10 mg= 1 tab(s), Oral, Daily  Livalo, 2 mg, Oral, Daily  Livalo 4 mg oral tablet, 4 mg= 1 tab(s), Oral, qPM  morphine 30 mg/8 hr oral tablet, extended release, 30 mg= 1 tab(s), Oral, BID,? ?Not Taking per Prescriber  Pantoprazole 40 mg ORAL EC-Tablet, 40 mg= 1 tab(s), Oral, Daily  traZODONE 50 mg oral tablet ( Desyrel ), 50 mg= 1 tab(s), Oral, qPM  Xarelto 20mg Tablet, 20 mg= 1 tab(s), Oral, Once  Zosyn, 3.375 gm, IV Piggyback, TID,? 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 Activity level: Moderate physical work. Highest education level: Some college. Operates hazardous equipment: No., 08/07/2013  Exercise - Does not exercise, 08/07/2013  Home/Environment  Lives with Spouse. Living situation: Home/Independent. Alcohol abuse in household: Yes. Substance abuse in household: No. Smoker in household: Yes., 08/07/2013  Nutrition/Health  Type of diet: 2 meals. Regular, Caffeine intake amount: 2-3 cups of coffee.. Sleeping concerns: Yes. Feels highly stressed: Yes., 08/07/2013  Sexual  Sexually active: No. Number of current partners 1. Sexual orientation: Heterosexual., 08/07/2013  Substance Abuse -  Denies Substance Abuse, 08/07/2013  Tobacco - High Risk, 08/07/2013  Former smoker, quit more than 30 days ago, Yes, 03/27/2019  Former smoker, 11/20/2017  Former smoker, 06/08/2016  Immunizations  Vaccine Date Status   influenza virus vaccine, inactivated 11/02/2017 Given   Health Maintenance  Health Maintenance  ???Pending?(in the next year)  ??? ??OverDue  ??? ? ? ?Coronary Artery Disease Maintenance-Lipid Lowering Therapy due??and every?  ??? ? ? ?Diabetes Screening due??and every?  ??? ? ? ?Aspirin Therapy for CVD Prevention due??11/06/18??and every 1??year(s)  ??? ? ? ?HF-Heart Failure Education due??11/21/18??and every 1??year(s)  ??? ? ? ?ADL Screening due??11/21/18??and every 1??year(s)  ??? ? ? ?Alcohol Misuse Screening due??01/01/19??and every 1??year(s)  ??? ? ? ?Obesity Screening due??01/01/19??and every 1??year(s)  ??? ??Due?  ??? ? ? ?Depression Screening due??05/01/19??and every?  ??? ? ? ?Tetanus Vaccine due??05/01/19??and every 10??year(s)  ??? ? ? ?Zoster Vaccine due??05/01/19??and every 100??year(s)  ??? ??Due In Future?  ??? ? ? ?HF-LVEF not due until??12/06/19??and every 2??year(s)  ??? ? ? ?Hypertension Management-BMP not due until??01/07/20??and every 1??year(s)  ??? ? ? ?Blood Pressure Screening not due until??04/23/20??and every 1??year(s)  ??? ? ? ?Body Mass Index Check not due until??04/23/20??and every 1??year(s)  ??? ? ? ?Hypertension Management-Blood Pressure not due until??04/23/20??and every 1??year(s)  ???Satisfied?(in the past 1 year)  ??? ??Satisfied?  ??? ? ? ?Blood Pressure Screening on??05/01/19.??Satisfied by Jackie Terrazas RN  ??? ? ? ?Hypertension Management-Blood Pressure on??05/01/19.??Satisfied by Jackie Terrazas RN  ?  ?

## 2022-05-03 NOTE — HISTORICAL [facility] CERNER
This is a historical note converted from Emelyn. Formatting and pictures may have been removed.  Please reference Emelyn for original formatting and attached multimedia. Chief Complaint  Left leg wound  History of Present Illness  59 yo male with a left leg wound. Patient currently using silver alginate, drawtex, with dry dressing.  Review of Systems  Constitutional:?no fever, fatigue, weakness  ENMT: no?nasal congestion/drainage  Respiratory:?no shortness of breath  Cardiovascular:?no chest pain, no edema  Gastrointestinal:?no nausea, vomiting  Hema/Lymph:?no abnormal bruising or bleeding  Musculoskeletal:?no muscle or joint pain, no joint swelling  Integumentary: Left leg wound  ?  ?  Physical Exam  Vitals & Measurements  T:?36.8? ?C (Oral)? HR:?83(Peripheral)? RR:?20? BP:?143/76?  HT:?182?cm? WT:?86?kg?  Incision/Wounds  ?1. Leg Left Other: open wound?- last charted: 04/03/2019 14:00  ?? ??Assessment Done By?- Wound Care Team  ?? ??Dressing Type?- Gauze dressing, Silver  ?? ??Dressing Assessment?- Drainage present, Intact  ?? ??Dressing Activity?- Changed  ?? ??Cleansing?- Cleaned with normal saline  ?? ??Length?- 12.5 cm  ?? ??Width?- 4.3 cm  ?? ??Depth?- 0.4 cm  ?? ??Wound Bed Tissue Type?- Granulating, Necrotic tissue, slough  ?? ??Percent Granulated?- 60 %  ?? ??Percent Necrotic Tissue Eschar?- 40 %  ?? ??Exudate Amount?- Moderate  ?? ??Exudate Type?- Serous  ?? ??Exudate Odor?- None  ?? ??Edge?- Attached to wound bed  ?? ??Status?- Improving  ?   ???? A large island of necrotic eschar is debrided from the wound with forceps and scissors and underlying necrotic muscle tissue is curette debrided.? A small?necrotic eschar is debrided from the lower, posterior portion of the wound.? Dried fibrinous crust is also debrided from the wound edges circumferentially,?to expose?a fresh wound edge?for contact with the TheraSkin graft which was tailored to cover the entire wound, followed by application of a graft  dressing.  ?  ?  Assessment/Plan  1.?Unspecified open wound, left lower leg, subsequent encounter  2.?Peripheral vascular disease  DO NOT CHANGE DRESSING. RTC friday for nurse visit to have secondary dressing change. RTC 1 week for visit. Clinic to order thera skin graft for next week.   Problem List/Past Medical History  Ongoing  Acute disease or injury-related malnutrition  Unspecified open wound, left lower leg, subsequent encounter  Historical  ATRIAL FIBRILLATION  CABG - Coronary artery bypass graft  CAD (coronary artery disease)  CHF (congestive heart failure)  HTN (hypertension)  Hyperlipidemia  Neuropathy  PVD (peripheral vascular disease)  Spinal stenosis  Procedure/Surgical History  Insertion of Infusion Device into Superior Vena Cava, Percutaneous Approach (11/05/2017)  Ultrasonography of Superior Vena Cava, Guidance (11/05/2017)  Excision of Omentum, Open Approach (11/01/2017)  Hernia Repair Incisional (Right) (11/01/2017)  Repair Right Inguinal Region, Open Approach (11/01/2017)  Transfusion of Nonautologous Red Blood Cells into Peripheral Vein, Percutaneous Approach (10/30/2017)  Abdominal Aortic Aneurysmectomy (.) (10/28/2017)  Dilation of Right External Iliac Artery with Intraluminal Device, Percutaneous Approach (10/28/2017)  Extirpation of Matter from Right External Iliac Artery, Open Approach (10/28/2017)  Plain Radiography of Pelvic Arteries using Low Osmolar Contrast (10/28/2017)  Restriction of Abdominal Aorta with Intraluminal Device, Open Approach (10/28/2017)  Bypass Femoral Popliteal (06/12/2016)  Dilation of Left Femoral Artery with Intraluminal Device, Percutaneous Approach (06/12/2016)  Embolectomy (06/12/2016)  Extirpation of Matter from Left Femoral Artery, Percutaneous Approach (06/12/2016)  Fluoroscopy of Left Lower Extremity Arteries using Low Osmolar Contrast (06/12/2016)  Monitoring of Arterial Pressure, Peripheral, Percutaneous Approach (06/12/2016)  Bypass Femoral Tibial (Left)  (06/08/2016)  Bypass Left Femoral Artery to Lower Extremity Artery with Nonautologous Tissue Substitute, Open Approach (06/08/2016)  Monitoring of Arterial Pressure, Peripheral, Percutaneous Approach (06/08/2016)  Debridement (eg, high pressure waterjet with/without suction, sharp selective debridement with scissors, scalpel and forceps), open wound, (eg, fibrin, devitalized epidermis and/or dermis, exudate, debris, biofilm), including topical application(s), wound (06/06/2016)  Debridement (eg, high pressure waterjet with/without suction, sharp selective debridement with scissors, scalpel and forceps), open wound, (eg, fibrin, devitalized epidermis and/or dermis, exudate, debris, biofilm), including topical application(s), wound (06/06/2016)  Debridement (eg, high pressure waterjet with/without suction, sharp selective debridement with scissors, scalpel and forceps), open wound, (eg, fibrin, devitalized epidermis and/or dermis, exudate, debris, biofilm), including topical application(s), wound (05/31/2016)  Debridement (eg, high pressure waterjet with/without suction, sharp selective debridement with scissors, scalpel and forceps), open wound, (eg, fibrin, devitalized epidermis and/or dermis, exudate, debris, biofilm), including topical application(s), wound (05/31/2016)  Debridement (eg, high pressure waterjet with/without suction, sharp selective debridement with scissors, scalpel and forceps), open wound, (eg, fibrin, devitalized epidermis and/or dermis, exudate, debris, biofilm), including topical application(s), wound (05/31/2016)  Debridement (eg, high pressure waterjet with/without suction, sharp selective debridement with scissors, scalpel and forceps), open wound, (eg, fibrin, devitalized epidermis and/or dermis, exudate, debris, biofilm), including topical application(s), wound (05/31/2016)  Debridement (eg, high pressure waterjet with/without suction, sharp selective debridement with scissors, scalpel  and forceps), open wound, (eg, fibrin, devitalized epidermis and/or dermis, exudate, debris, biofilm), including topical application(s), wound (05/31/2016)  Debridement (eg, high pressure waterjet with/without suction, sharp selective debridement with scissors, scalpel and forceps), open wound, (eg, fibrin, devitalized epidermis and/or dermis, exudate, debris, biofilm), including topical application(s), wound (05/31/2016)  Excision of Left Lower Leg Skin, External Approach (05/31/2016)  Debridement (eg, high pressure waterjet with/without suction, sharp selective debridement with scissors, scalpel and forceps), open wound, (eg, fibrin, devitalized epidermis and/or dermis, exudate, debris, biofilm), including topical application(s), wound (05/17/2016)  Debridement (eg, high pressure waterjet with/without suction, sharp selective debridement with scissors, scalpel and forceps), open wound, (eg, fibrin, devitalized epidermis and/or dermis, exudate, debris, biofilm), including topical application(s), wound (05/17/2016)  Debridement (eg, high pressure waterjet with/without suction, sharp selective debridement with scissors, scalpel and forceps), open wound, (eg, fibrin, devitalized epidermis and/or dermis, exudate, debris, biofilm), including topical application(s), wound (05/17/2016)  Debridement (eg, high pressure waterjet with/without suction, sharp selective debridement with scissors, scalpel and forceps), open wound, (eg, fibrin, devitalized epidermis and/or dermis, exudate, debris, biofilm), including topical application(s), wound (05/17/2016)  Debridement (eg, high pressure waterjet with/without suction, sharp selective debridement with scissors, scalpel and forceps), open wound, (eg, fibrin, devitalized epidermis and/or dermis, exudate, debris, biofilm), including topical application(s), wound (05/17/2016)  Debridement (eg, high pressure waterjet with/without suction, sharp selective debridement with scissors,  scalpel and forceps), open wound, (eg, fibrin, devitalized epidermis and/or dermis, exudate, debris, biofilm), including topical application(s), wound (05/17/2016)  Excision of Left Foot Skin, External Approach (05/17/2016)  Excision of Left Lower Leg Skin, External Approach (05/17/2016)  Fluoroscopy of Aorta and Bilateral Lower Extremity Arteries using Low Osmolar Contrast (03/09/2016)  Introduction of catheter, aorta (03/09/2016)  Bypass Femoral Tibial (Left) (02/01/2016)  Bypass Left Femoral Artery to Popliteal Artery with Autologous Venous Tissue, Open Approach (02/01/2016)  Excision of Right Greater Saphenous Vein, Percutaneous Endoscopic Approach (02/01/2016)  Insertion of Monitoring Device into Upper Artery, Percutaneous Approach (02/01/2016)  Monitoring of Arterial Pressure, Peripheral, Percutaneous Approach (02/01/2016)  Injection Lumbar Epidural Steroid (., Back) (12/09/2014)  Injection of anesthetic into spinal canal for analgesia (12/09/2014)  Injection of other agent into spinal canal (12/09/2014)  Injection of steroid (12/09/2014)  Injection(s), anesthetic agent and/or steroid, transforaminal epidural, with imaging guidance (fluoroscopy or CT); lumbar or sacral, each additional level (List separately in addition to code for primary procedure). (12/09/2014)  Injection(s), anesthetic agent and/or steroid, transforaminal epidural, with imaging guidance (fluoroscopy or CT); lumbar or sacral, single level. (12/09/2014)  Other x-ray of lumbosacral spine (12/09/2014)  Injection Lumbar Epidural Steroid (.) (08/12/2014)  Injection of anesthetic into spinal canal for analgesia (08/12/2014)  Injection of other agent into spinal canal (08/12/2014)  Injection of steroid (08/12/2014)  Injection(s), anesthetic agent and/or steroid, transforaminal epidural, with imaging guidance (fluoroscopy or CT); lumbar or sacral, single level. (08/12/2014)  Other x-ray of lumbosacral spine (08/12/2014)  Injection Lumbar Epidural  Steroid (., None) (07/03/2014)  Injection of anesthetic into spinal canal for analgesia (07/03/2014)  Injection of other agent into spinal canal (07/03/2014)  Injection of steroid (07/03/2014)  Injection(s), anesthetic agent and/or steroid, transforaminal epidural, with imaging guidance (fluoroscopy or CT); lumbar or sacral, single level. (07/03/2014)  Other x-ray of lumbosacral spine (07/03/2014)  Injection Lumbar Epidural Steroid (.) (04/01/2014)  Injection of anesthetic into spinal canal for analgesia (04/01/2014)  Injection of other agent into spinal canal (04/01/2014)  Injection of steroid (04/01/2014)  Injection(s), anesthetic agent and/or steroid, transforaminal epidural, with imaging guidance (fluoroscopy or CT); lumbar or sacral, single level. (04/01/2014)  Other x-ray of lumbosacral spine (04/01/2014)  Coronary artery bypass, using arterial graft(s) (1999)  Appendectomy; for ruptured appendix with abscess or generalized peritonitis (1992)  hernia (08/06/1975)  AAA (abdominal aortic aneurysm)  CABG - Coronary artery bypass graft  Hemorrhoidectomy   Medications  amoxicillin-clavulanate 875 mg-125 mg oral tablet, 1 tab(s), Oral, BID  carvedilol 25 mg oral tablet, 25 mg= 1 tab(s), Oral, BID  ciprofloxacin 500 mg oral tablet, 500 mg= 1 tab(s), Oral, BID,? ?Not Taking per Prescriber  clindamycin 150 mg oral capsule, 300 mg= 2 cap(s), Oral, QID  clindamycin 300 mg oral capsule, 300 mg= 1 cap(s), Oral, TID  cyclobenzaprine 10 mg oral tablet, 10 mg= 1 tab(s), Oral, TID, PRN  digoxin 125 mcg (0.125 mg) oral tablet, 125 mcg= 1 tab(s), Oral, Daily  digoxin 250 mcg (0.25 mg) oral tablet, 250 mcg= 1 tab(s), Oral, Daily  Diltia  mg/24 hours oral capsule, extended release, 240 mg= 1 cap(s), Oral, Daily  finasteride 5 mg oral tablet, 5 mg= 1 tab(s), Oral, Daily  Hydrocodone/Apap 10/325, Oral, QID, PRN,? ?Still taking, not as prescribed: taking 5 times a day  isosorbide MONOnitrate 30 mg oral tablet, Extended Release,  30 mg= 1 tab(s), Oral, qAM,? ?Not Taking per Prescriber  lisinopril 10 mg oral tablet, 10 mg= 1 tab(s), Oral, Daily  Livalo, 2 mg, Oral, Daily  Livalo 4 mg oral tablet, 4 mg= 1 tab(s), Oral, qPM  morphine 30 mg/8 hr oral tablet, extended release, 30 mg= 1 tab(s), Oral, BID,? ?Not Taking per Prescriber  Pantoprazole 40 mg ORAL EC-Tablet, 40 mg= 1 tab(s), Oral, Daily  traZODONE 50 mg oral tablet ( Desyrel ), 50 mg= 1 tab(s), Oral, qPM  Xarelto 20mg Tablet, 20 mg= 1 tab(s), Oral, Once  Zosyn, 3.375 gm, IV Piggyback, TID,? 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 Activity level: Moderate physical work. Highest education level: Some college. Operates hazardous equipment: No., 08/07/2013  Exercise - Does not exercise, 08/07/2013  Home/Environment  Lives with Spouse. Living situation: Home/Independent. Alcohol abuse in household: Yes. Substance abuse in household: No. Smoker in household: Yes., 08/07/2013  Nutrition/Health  Type of diet: 2 meals. Regular, Caffeine intake amount: 2-3 cups of coffee.. Sleeping concerns: Yes. Feels highly stressed: Yes., 08/07/2013  Sexual  Sexually active: No. Number of current partners 1. Sexual orientation: Heterosexual., 08/07/2013  Substance Abuse - Denies Substance Abuse, 08/07/2013  Tobacco - High Risk, 08/07/2013  Former smoker, quit more than 30 days ago, Yes, 03/27/2019  Former smoker, 11/20/2017  Former smoker, 06/08/2016  Immunizations  Vaccine Date Status   influenza virus vaccine, inactivated 11/02/2017 Given   Health Maintenance  Health Maintenance  ???Pending?(in the next year)  ??? ??OverDue  ??? ? ? ?Coronary Artery Disease Maintenance-Lipid Lowering Therapy due??and every?  ??? ? ? ?Diabetes Screening due??and every?  ??? ? ? ?Obesity Screening due??11/05/18??and every 1??year(s)  ??? ? ?  ?Aspirin Therapy for CVD Prevention due??11/06/18??and every 1??year(s)  ??? ? ? ?HF-Heart Failure Education due??11/21/18??and every 1??year(s)  ??? ? ? ?ADL Screening due??11/21/18??and every 1??year(s)  ??? ??Due?  ??? ? ? ?Alcohol Misuse Screening due??04/03/19??and every 1??year(s)  ??? ? ? ?Depression Screening due??04/03/19??and every?  ??? ? ? ?Tetanus Vaccine due??04/03/19??and every 10??year(s)  ??? ? ? ?Zoster Vaccine due??04/03/19??and every 100??year(s)  ??? ??Due In Future?  ??? ? ? ?HF-LVEF not due until??12/06/19??and every 2??year(s)  ??? ? ? ?Hypertension Management-BMP not due until??01/07/20??and every 1??year(s)  ??? ? ? ?Blood Pressure Screening not due until??03/26/20??and every 1??year(s)  ??? ? ? ?Body Mass Index Check not due until??03/26/20??and every 1??year(s)  ??? ? ? ?Hypertension Management-Blood Pressure not due until??03/26/20??and every 1??year(s)  ???Satisfied?(in the past 1 year)  ??? ??Satisfied?  ??? ? ? ?Blood Pressure Screening on??04/03/19.??Satisfied by Jackie Terrazas RN  ??? ? ? ?Hypertension Management-Blood Pressure on??04/03/19.??Satisfied by Jackie Terrazas RN  ?  ?

## 2022-05-03 NOTE — HISTORICAL [facility]ER
This is a historical note converted from Emelyn. Formatting and pictures may have been removed.  Please reference Emelyn for original formatting and attached multimedia. Chief Complaint  left leg wound  History of Present Illness  59 yo male with a left leg open?wound . TheraSkin intact.  Review of Systems  Constitutional:?no fever, fatigue, weakness  ENMT:?no?nasal congestion/drainage  Respiratory:?no couch, no shortness of breath  Cardiovascular:?no chest pain, no palpitations, no edema  Gastrointestinal:?no nausea, vomiting  Hema/Lymph:?no abnormal bruising or bleeding  Musculoskeletal:?no muscle or joint pain, no joint swelling  Integumentary:?left leg wound  ?  ?  Physical Exam  Vitals & Measurements  T:?36.2? ?C (Oral)? HR:?80(Peripheral)? RR:?20? BP:?136/76?  HT:?182?cm? WT:?86?kg?  Incision/Wounds  ?1. Leg Left Other: open wound?- last charted: 05/08/2019 11:06  ?? ??Assessment Done By?- Wound Care Team  ?? ??Abnormality Color?- Red  ?? ??Dressing Type?- Gauze dressing  ?? ??Dressing Assessment?- Drainage present  ?? ??Dressing Activity?- Changed  ?? ??Cleansing?- Commercial cleansing solution  ?? ??Length?- 9.5 cm  ?? ??Width?- 2.3 cm  ?? ??Depth?- 0.7 cm  ?? ??Wound Bed Tissue Type?- Granulating  ?? ??Percent Granulated?- 50 %  ?? ??Percent Other Tissue?- 50 %  ?? ??Exudate Amount?- Moderate  ?? ??Exudate Type?- Serosanguineous  ?? ??Exudate Odor?- Blood  ?? ??Edge?- Attached to wound bed  ?? ??Surrounding Tissue Color?- Normal  ?? ??Status?- Improving  ?  Mr. Tiwari was in?for?treatment to his?extensive wound on the left pretibial lower extremity.? This is a?deep wound with tendon exposed, and fairly large.? There is new skin growth along the edges. ?Base is?hyper granulated?and moist. ?Silver nitrate was applied to?hyper granulation tissue?before a new skin graft?was applied to cover the entire wound. ?A bolstering?dressing was then applied?over the Theraskin graft.  Assessment/Plan  1.?Unspecified open  wound, left lower leg, subsequent encounter?S81.802D  2.?History of fasciotomy?Z98.890  3.?Atherosclerotic peripheral vascular disease?I70.209  The patient is taking Augmentin, clindamycin, and Cipro. DO NOT CHANGE DRESSING. Clinic to order another application of theraskin for next week. RTC 1 week   Problem List/Past Medical History  Ongoing  Acute disease or injury-related malnutrition  History of fasciotomy  Unspecified open wound, left lower leg, subsequent encounter  Historical  ATRIAL FIBRILLATION  CABG - Coronary artery bypass graft  CAD (coronary artery disease)  CHF (congestive heart failure)  HTN (hypertension)  Hyperlipidemia  Neuropathy  PVD (peripheral vascular disease)  Spinal stenosis  Procedure/Surgical History  Application of skin substitute graft to trunk, arms, legs, total wound surface area up to 100 sq cm; each additional 25 sq cm wound surface area, or part thereof (List separately in addition to code for primary procedure) (04/24/2019)  Application of skin substitute graft to trunk, arms, legs, total wound surface area up to 100 sq cm; first 25 sq cm or less wound surface area (04/24/2019)  Replacement of Left Lower Leg Skin with Nonautologous Tissue Substitute, Full Thickness, External Approach (04/24/2019)  Theraskin, per square centimeter (04/24/2019)  Application of skin substitute graft to trunk, arms, legs, total wound surface area up to 100 sq cm; each additional 25 sq cm wound surface area, or part thereof (List separately in addition to code for primary procedure) (04/17/2019)  Application of skin substitute graft to trunk, arms, legs, total wound surface area up to 100 sq cm; each additional 25 sq cm wound surface area, or part thereof (List separately in addition to code for primary procedure) (04/17/2019)  Application of skin substitute graft to trunk, arms, legs, total wound surface area up to 100 sq cm; first 25 sq cm or less wound surface area (04/17/2019)  Replacement of Left  Lower Leg Skin with Nonautologous Tissue Substitute, Full Thickness, External Approach (04/17/2019)  Theraskin, per square centimeter (04/17/2019)  Application of skin substitute graft to trunk, arms, legs, total wound surface area up to 100 sq cm; each additional 25 sq cm wound surface area, or part thereof (List separately in addition to code for primary procedure) (04/10/2019)  Application of skin substitute graft to trunk, arms, legs, total wound surface area up to 100 sq cm; each additional 25 sq cm wound surface area, or part thereof (List separately in addition to code for primary procedure) (04/10/2019)  Application of skin substitute graft to trunk, arms, legs, total wound surface area up to 100 sq cm; each additional 25 sq cm wound surface area, or part thereof (List separately in addition to code for primary procedure) (04/10/2019)  Application of skin substitute graft to trunk, arms, legs, total wound surface area up to 100 sq cm; first 25 sq cm or less wound surface area (04/10/2019)  Application of skin substitute graft to trunk, arms, legs, total wound surface area up to 100 sq cm; first 25 sq cm or less wound surface area (04/10/2019)  Replacement of Left Lower Leg Skin with Nonautologous Tissue Substitute, Full Thickness, External Approach (04/10/2019)  Surgical preparation or creation of recipient site by excision of open wounds, burn eschar, or scar (including subcutaneous tissues), or incisional release of scar contracture, trunk, arms, legs; first 100 sq cm or 1% of body area of infants and children (04/10/2019)  Theraskin, per square centimeter (04/10/2019)  Theraskin, per square centimeter (04/10/2019)  Replacement of Left Lower Leg Skin with Nonautologous Tissue Substitute, Full Thickness, External Approach (04/03/2019)  Insertion of Infusion Device into Superior Vena Cava, Percutaneous Approach (11/05/2017)  Ultrasonography of Superior Vena Cava, Guidance (11/05/2017)  Excision of Omentum,  Open Approach (11/01/2017)  Hernia Repair Incisional (Right) (11/01/2017)  Repair Right Inguinal Region, Open Approach (11/01/2017)  Transfusion of Nonautologous Red Blood Cells into Peripheral Vein, Percutaneous Approach (10/30/2017)  Abdominal Aortic Aneurysmectomy (.) (10/28/2017)  Dilation of Right External Iliac Artery with Intraluminal Device, Percutaneous Approach (10/28/2017)  Extirpation of Matter from Right External Iliac Artery, Open Approach (10/28/2017)  Plain Radiography of Pelvic Arteries using Low Osmolar Contrast (10/28/2017)  Restriction of Abdominal Aorta with Intraluminal Device, Open Approach (10/28/2017)  Bypass Femoral Popliteal (06/12/2016)  Dilation of Left Femoral Artery with Intraluminal Device, Percutaneous Approach (06/12/2016)  Embolectomy (06/12/2016)  Extirpation of Matter from Left Femoral Artery, Percutaneous Approach (06/12/2016)  Fluoroscopy of Left Lower Extremity Arteries using Low Osmolar Contrast (06/12/2016)  Monitoring of Arterial Pressure, Peripheral, Percutaneous Approach (06/12/2016)  Bypass Femoral Tibial (Left) (06/08/2016)  Bypass Left Femoral Artery to Lower Extremity Artery with Nonautologous Tissue Substitute, Open Approach (06/08/2016)  Monitoring of Arterial Pressure, Peripheral, Percutaneous Approach (06/08/2016)  Debridement (eg, high pressure waterjet with/without suction, sharp selective debridement with scissors, scalpel and forceps), open wound, (eg, fibrin, devitalized epidermis and/or dermis, exudate, debris, biofilm), including topical application(s), wound (06/06/2016)  Debridement (eg, high pressure waterjet with/without suction, sharp selective debridement with scissors, scalpel and forceps), open wound, (eg, fibrin, devitalized epidermis and/or dermis, exudate, debris, biofilm), including topical application(s), wound (06/06/2016)  Debridement (eg, high pressure waterjet with/without suction, sharp selective debridement with scissors, scalpel and  forceps), open wound, (eg, fibrin, devitalized epidermis and/or dermis, exudate, debris, biofilm), including topical application(s), wound (05/31/2016)  Debridement (eg, high pressure waterjet with/without suction, sharp selective debridement with scissors, scalpel and forceps), open wound, (eg, fibrin, devitalized epidermis and/or dermis, exudate, debris, biofilm), including topical application(s), wound (05/31/2016)  Debridement (eg, high pressure waterjet with/without suction, sharp selective debridement with scissors, scalpel and forceps), open wound, (eg, fibrin, devitalized epidermis and/or dermis, exudate, debris, biofilm), including topical application(s), wound (05/31/2016)  Debridement (eg, high pressure waterjet with/without suction, sharp selective debridement with scissors, scalpel and forceps), open wound, (eg, fibrin, devitalized epidermis and/or dermis, exudate, debris, biofilm), including topical application(s), wound (05/31/2016)  Debridement (eg, high pressure waterjet with/without suction, sharp selective debridement with scissors, scalpel and forceps), open wound, (eg, fibrin, devitalized epidermis and/or dermis, exudate, debris, biofilm), including topical application(s), wound (05/31/2016)  Debridement (eg, high pressure waterjet with/without suction, sharp selective debridement with scissors, scalpel and forceps), open wound, (eg, fibrin, devitalized epidermis and/or dermis, exudate, debris, biofilm), including topical application(s), wound (05/31/2016)  Excision of Left Lower Leg Skin, External Approach (05/31/2016)  Debridement (eg, high pressure waterjet with/without suction, sharp selective debridement with scissors, scalpel and forceps), open wound, (eg, fibrin, devitalized epidermis and/or dermis, exudate, debris, biofilm), including topical application(s), wound (05/17/2016)  Debridement (eg, high pressure waterjet with/without suction, sharp selective debridement with scissors,  scalpel and forceps), open wound, (eg, fibrin, devitalized epidermis and/or dermis, exudate, debris, biofilm), including topical application(s), wound (05/17/2016)  Debridement (eg, high pressure waterjet with/without suction, sharp selective debridement with scissors, scalpel and forceps), open wound, (eg, fibrin, devitalized epidermis and/or dermis, exudate, debris, biofilm), including topical application(s), wound (05/17/2016)  Debridement (eg, high pressure waterjet with/without suction, sharp selective debridement with scissors, scalpel and forceps), open wound, (eg, fibrin, devitalized epidermis and/or dermis, exudate, debris, biofilm), including topical application(s), wound (05/17/2016)  Debridement (eg, high pressure waterjet with/without suction, sharp selective debridement with scissors, scalpel and forceps), open wound, (eg, fibrin, devitalized epidermis and/or dermis, exudate, debris, biofilm), including topical application(s), wound (05/17/2016)  Debridement (eg, high pressure waterjet with/without suction, sharp selective debridement with scissors, scalpel and forceps), open wound, (eg, fibrin, devitalized epidermis and/or dermis, exudate, debris, biofilm), including topical application(s), wound (05/17/2016)  Excision of Left Foot Skin, External Approach (05/17/2016)  Excision of Left Lower Leg Skin, External Approach (05/17/2016)  Fluoroscopy of Aorta and Bilateral Lower Extremity Arteries using Low Osmolar Contrast (03/09/2016)  Introduction of catheter, aorta (03/09/2016)  Bypass Femoral Tibial (Left) (02/01/2016)  Bypass Left Femoral Artery to Popliteal Artery with Autologous Venous Tissue, Open Approach (02/01/2016)  Excision of Right Greater Saphenous Vein, Percutaneous Endoscopic Approach (02/01/2016)  Insertion of Monitoring Device into Upper Artery, Percutaneous Approach (02/01/2016)  Monitoring of Arterial Pressure, Peripheral, Percutaneous Approach (02/01/2016)  Injection Lumbar Epidural  Steroid (., Back) (12/09/2014)  Injection of anesthetic into spinal canal for analgesia (12/09/2014)  Injection of other agent into spinal canal (12/09/2014)  Injection of steroid (12/09/2014)  Injection(s), anesthetic agent and/or steroid, transforaminal epidural, with imaging guidance (fluoroscopy or CT); lumbar or sacral, each additional level (List separately in addition to code for primary procedure). (12/09/2014)  Injection(s), anesthetic agent and/or steroid, transforaminal epidural, with imaging guidance (fluoroscopy or CT); lumbar or sacral, single level. (12/09/2014)  Other x-ray of lumbosacral spine (12/09/2014)  Injection Lumbar Epidural Steroid (.) (08/12/2014)  Injection of anesthetic into spinal canal for analgesia (08/12/2014)  Injection of other agent into spinal canal (08/12/2014)  Injection of steroid (08/12/2014)  Injection(s), anesthetic agent and/or steroid, transforaminal epidural, with imaging guidance (fluoroscopy or CT); lumbar or sacral, single level. (08/12/2014)  Other x-ray of lumbosacral spine (08/12/2014)  Injection Lumbar Epidural Steroid (., None) (07/03/2014)  Injection of anesthetic into spinal canal for analgesia (07/03/2014)  Injection of other agent into spinal canal (07/03/2014)  Injection of steroid (07/03/2014)  Injection(s), anesthetic agent and/or steroid, transforaminal epidural, with imaging guidance (fluoroscopy or CT); lumbar or sacral, single level. (07/03/2014)  Other x-ray of lumbosacral spine (07/03/2014)  Injection Lumbar Epidural Steroid (.) (04/01/2014)  Injection of anesthetic into spinal canal for analgesia (04/01/2014)  Injection of other agent into spinal canal (04/01/2014)  Injection of steroid (04/01/2014)  Injection(s), anesthetic agent and/or steroid, transforaminal epidural, with imaging guidance (fluoroscopy or CT); lumbar or sacral, single level. (04/01/2014)  Other x-ray of lumbosacral spine (04/01/2014)  Coronary artery bypass, using arterial  graft(s) (1999)  Appendectomy; for ruptured appendix with abscess or generalized peritonitis (1992)  hernia (08/06/1975)  AAA (abdominal aortic aneurysm)  Hemorrhoidectomy   Medications  amoxicillin-clavulanate 875 mg-125 mg oral tablet, 1 tab(s), Oral, BID  carvedilol 25 mg oral tablet, 25 mg= 1 tab(s), Oral, BID  ciprofloxacin 500 mg oral tablet, 500 mg= 1 tab(s), Oral, q12hr, 5 refills  clindamycin 300 mg oral capsule, 300 mg= 1 cap(s), Oral, QID, 5 refills  cyclobenzaprine 10 mg oral tablet, 10 mg= 1 tab(s), Oral, TID, PRN  digoxin 125 mcg (0.125 mg) oral tablet, 125 mcg= 1 tab(s), Oral, Daily  digoxin 250 mcg (0.25 mg) oral tablet, 250 mcg= 1 tab(s), Oral, Daily  Diltia  mg/24 hours oral capsule, extended release, 240 mg= 1 cap(s), Oral, Daily  finasteride 5 mg oral tablet, 5 mg= 1 tab(s), Oral, Daily  isosorbide MONOnitrate 30 mg oral tablet, Extended Release, 30 mg= 1 tab(s), Oral, qAM,? ?Not Taking per Prescriber  lisinopril 10 mg oral tablet, 10 mg= 1 tab(s), Oral, Daily  Livalo, 2 mg, Oral, Daily  Livalo 4 mg oral tablet, 4 mg= 1 tab(s), Oral, qPM  morphine 30 mg/8 hr oral tablet, extended release, 30 mg= 1 tab(s), Oral, BID,? ?Not Taking per Prescriber  Pantoprazole 40 mg ORAL EC-Tablet, 40 mg= 1 tab(s), Oral, Daily  traZODONE 50 mg oral tablet ( Desyrel ), 50 mg= 1 tab(s), Oral, qPM  Zosyn, 3.375 gm, IV Piggyback, TID,? 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 Activity level: Moderate physical work. Highest education level: Some college. Operates hazardous equipment: No., 08/07/2013  Exercise - Does not exercise, 08/07/2013  Home/Environment  Lives with Spouse. Living situation: Home/Independent. Alcohol abuse in household: Yes. Substance abuse in household: No. Smoker in household: Yes.,  08/07/2013  Nutrition/Health  Type of diet: 2 meals. Regular, Caffeine intake amount: 2-3 cups of coffee.. Sleeping concerns: Yes. Feels highly stressed: Yes., 08/07/2013  Sexual  Sexually active: No. Number of current partners 1. Sexual orientation: Heterosexual., 08/07/2013  Substance Abuse - Denies Substance Abuse, 08/07/2013  Tobacco - High Risk, 08/07/2013  Former smoker, quit more than 30 days ago, Yes, 03/27/2019  Former smoker, 11/20/2017  Former smoker, 06/08/2016  Immunizations  Vaccine Date Status   influenza virus vaccine, inactivated 11/02/2017 Given   Health Maintenance  Health Maintenance  ???Pending?(in the next year)  ??? ??OverDue  ??? ? ? ?Coronary Artery Disease Maintenance-Lipid Lowering Therapy due??and every?  ??? ? ? ?Diabetes Screening due??and every?  ??? ? ? ?Aspirin Therapy for CVD Prevention due??11/06/18??and every 1??year(s)  ??? ? ? ?HF-Heart Failure Education due??11/21/18??and every 1??year(s)  ??? ? ? ?ADL Screening due??11/21/18??and every 1??year(s)  ??? ? ? ?Alcohol Misuse Screening due??01/01/19??and every 1??year(s)  ??? ? ? ?Obesity Screening due??01/01/19??and every 1??year(s)  ??? ??Due?  ??? ? ? ?Depression Screening due??05/08/19??and every?  ??? ? ? ?Tetanus Vaccine due??05/08/19??and every 10??year(s)  ??? ? ? ?Zoster Vaccine due??05/08/19??and every 100??year(s)  ??? ??Due In Future?  ??? ? ? ?HF-LVEF not due until??12/06/19??and every 2??year(s)  ??? ? ? ?Hypertension Management-BMP not due until??01/07/20??and every 1??year(s)  ??? ? ? ?Blood Pressure Screening not due until??04/30/20??and every 1??year(s)  ??? ? ? ?Body Mass Index Check not due until??04/30/20??and every 1??year(s)  ??? ? ? ?Hypertension Management-Blood Pressure not due until??04/30/20??and every 1??year(s)  ???Satisfied?(in the past 1 year)  ??? ??Satisfied?  ??? ? ? ?Blood Pressure Screening on??05/08/19.??Satisfied by Neal Mcfadden.  ??? ? ? ?Hypertension Management-Blood Pressure  on??05/08/19.??Satisfied by Neal Mcfadden  ?  ?

## 2022-05-03 NOTE — HISTORICAL [facility]ERNER
This is a historical note converted from Emelyn. Formatting and pictures may have been removed.  Please reference Emelyn for original formatting and attached multimedia. Chief Complaint  Left leg wound  History of Present Illness  59 yo male with a left leg wound. This left leg fasciotomy wound is being treated with TheraSkin grafts.  Review of Systems  Constitutional:?no fever, fatigue, weakness  ENMT: no?nasal congestion/drainage  Respiratory:?no shortness of breath  Cardiovascular:?no chest pain, no edema  Gastrointestinal:?no nausea, vomiting  Hema/Lymph:?no abnormal bruising or bleeding  Musculoskeletal:?no muscle or joint pain, no joint swelling  Integumentary: Left leg wound  ?  ?  Physical Exam  Vitals & Measurements  T:?36.8? ?C (Oral)? HR:?76(Peripheral)? RR:?20? BP:?132/79?  HT:?183?cm? WT:?93.9?kg?  Incision/Wounds  ?1. Leg Left Other: open wound?- last charted: 05/22/2019 10:00  ?? ??Assessment Done By?- Wound Care Team  ?? ??Dressing Type?- Collagen, Gauze dressing  ?? ??Dressing Assessment?- Drainage present, Intact  ?? ??Dressing Activity?- Removed  ?? ??Cleansing?- Cleaned with normal saline  ?? ??Length?- 10.0 cm  ?? ??Width?- 2.4 cm  ?? ??Depth?- 0.4 cm  ?? ??Wound Bed Tissue Type?- Granulating, Necrotic tissue, slough  ?? ??Percent Granulated?- 90 %  ?? ??Percent Necrotic Tissue Slough?- 10 %  ?? ??Exudate Amount?- Small  ?? ??Exudate Type?- Serous  ?? ??Exudate Odor?- None  ?? ??Edge?- Attached to wound bed  ?? ??Status?- Improving  ?   ???? The dimensions of the wound are smaller.? Staph. aureus sensitive to doxycycline was cultured from the wound.? There are two?deep recessed in the wound, the result debridement of?subfascial devitalized skeletal muscle.? After removal of?the graft applied last week, the wound is curette-debrided of fibrinous slough, including?what was present in the two cavities.? The cavities are filled with Puracol and then the?TheraSkin graft, which is cut into two equal  seaprate segments is applied to completely cover the wound and cemented in place with a graft dressing to remain in place?until his next visit in one week.? Rx. transmitted to the patients pharmacy for doxycycline 100mg po?q12h for 14days, with 5 refills.  ?  ?  Assessment/Plan  1.?Unspecified open wound, left lower leg, subsequent encounter?S81.802D  2.?History of fasciotomy?Z98.890  3.?Atherosclerotic vascular disease?I70.90  DO NOT CHANGE DRESSING, clinic to order another graft application for next week. RTC 1 week   Problem List/Past Medical History  Ongoing  Acute disease or injury-related malnutrition  Atherosclerotic vascular disease  History of fasciotomy  Unspecified open wound, left lower leg, subsequent encounter  Historical  ATRIAL FIBRILLATION  CABG - Coronary artery bypass graft  CAD (coronary artery disease)  CHF (congestive heart failure)  HTN (hypertension)  Hyperlipidemia  Neuropathy  PVD (peripheral vascular disease)  Spinal stenosis  Procedure/Surgical History  Application of skin substitute graft to trunk, arms, legs, total wound surface area up to 100 sq cm; each additional 25 sq cm wound surface area, or part thereof (List separately in addition to code for primary procedure) (05/08/2019)  Application of skin substitute graft to trunk, arms, legs, total wound surface area up to 100 sq cm; first 25 sq cm or less wound surface area (05/08/2019)  Replacement of Left Lower Leg Skin with Nonautologous Tissue Substitute, Full Thickness, External Approach (05/08/2019)  Theraskin, per square centimeter (05/08/2019)  Application of skin substitute graft to trunk, arms, legs, total wound surface area up to 100 sq cm; each additional 25 sq cm wound surface area, or part thereof (List separately in addition to code for primary procedure) (05/01/2019)  Application of skin substitute graft to trunk, arms, legs, total wound surface area up to 100 sq cm; first 25 sq cm or less wound surface area  (05/01/2019)  Replacement of Left Lower Leg Skin with Nonautologous Tissue Substitute, Full Thickness, External Approach (05/01/2019)  Theraskin, per square centimeter (05/01/2019)  Application of skin substitute graft to trunk, arms, legs, total wound surface area up to 100 sq cm; each additional 25 sq cm wound surface area, or part thereof (List separately in addition to code for primary procedure) (04/24/2019)  Application of skin substitute graft to trunk, arms, legs, total wound surface area up to 100 sq cm; first 25 sq cm or less wound surface area (04/24/2019)  Replacement of Left Lower Leg Skin with Nonautologous Tissue Substitute, Full Thickness, External Approach (04/24/2019)  Theraskin, per square centimeter (04/24/2019)  Application of skin substitute graft to trunk, arms, legs, total wound surface area up to 100 sq cm; each additional 25 sq cm wound surface area, or part thereof (List separately in addition to code for primary procedure) (04/17/2019)  Application of skin substitute graft to trunk, arms, legs, total wound surface area up to 100 sq cm; each additional 25 sq cm wound surface area, or part thereof (List separately in addition to code for primary procedure) (04/17/2019)  Application of skin substitute graft to trunk, arms, legs, total wound surface area up to 100 sq cm; first 25 sq cm or less wound surface area (04/17/2019)  Replacement of Left Lower Leg Skin with Nonautologous Tissue Substitute, Full Thickness, External Approach (04/17/2019)  Theraskin, per square centimeter (04/17/2019)  Application of skin substitute graft to trunk, arms, legs, total wound surface area up to 100 sq cm; each additional 25 sq cm wound surface area, or part thereof (List separately in addition to code for primary procedure) (04/10/2019)  Application of skin substitute graft to trunk, arms, legs, total wound surface area up to 100 sq cm; each additional 25 sq cm wound surface area, or part thereof (List  separately in addition to code for primary procedure) (04/10/2019)  Application of skin substitute graft to trunk, arms, legs, total wound surface area up to 100 sq cm; each additional 25 sq cm wound surface area, or part thereof (List separately in addition to code for primary procedure) (04/10/2019)  Application of skin substitute graft to trunk, arms, legs, total wound surface area up to 100 sq cm; first 25 sq cm or less wound surface area (04/10/2019)  Application of skin substitute graft to trunk, arms, legs, total wound surface area up to 100 sq cm; first 25 sq cm or less wound surface area (04/10/2019)  Replacement of Left Lower Leg Skin with Nonautologous Tissue Substitute, Full Thickness, External Approach (04/10/2019)  Surgical preparation or creation of recipient site by excision of open wounds, burn eschar, or scar (including subcutaneous tissues), or incisional release of scar contracture, trunk, arms, legs; first 100 sq cm or 1% of body area of infants and children (04/10/2019)  Theraskin, per square centimeter (04/10/2019)  Theraskin, per square centimeter (04/10/2019)  Replacement of Left Lower Leg Skin with Nonautologous Tissue Substitute, Full Thickness, External Approach (04/03/2019)  Insertion of Infusion Device into Superior Vena Cava, Percutaneous Approach (11/05/2017)  Ultrasonography of Superior Vena Cava, Guidance (11/05/2017)  Excision of Omentum, Open Approach (11/01/2017)  Hernia Repair Incisional (Right) (11/01/2017)  Repair Right Inguinal Region, Open Approach (11/01/2017)  Transfusion of Nonautologous Red Blood Cells into Peripheral Vein, Percutaneous Approach (10/30/2017)  Abdominal Aortic Aneurysmectomy (.) (10/28/2017)  Dilation of Right External Iliac Artery with Intraluminal Device, Percutaneous Approach (10/28/2017)  Extirpation of Matter from Right External Iliac Artery, Open Approach (10/28/2017)  Plain Radiography of Pelvic Arteries using Low Osmolar Contrast  (10/28/2017)  Restriction of Abdominal Aorta with Intraluminal Device, Open Approach (10/28/2017)  Bypass Femoral Popliteal (06/12/2016)  Dilation of Left Femoral Artery with Intraluminal Device, Percutaneous Approach (06/12/2016)  Embolectomy (06/12/2016)  Extirpation of Matter from Left Femoral Artery, Percutaneous Approach (06/12/2016)  Fluoroscopy of Left Lower Extremity Arteries using Low Osmolar Contrast (06/12/2016)  Monitoring of Arterial Pressure, Peripheral, Percutaneous Approach (06/12/2016)  Bypass Femoral Tibial (Left) (06/08/2016)  Bypass Left Femoral Artery to Lower Extremity Artery with Nonautologous Tissue Substitute, Open Approach (06/08/2016)  Monitoring of Arterial Pressure, Peripheral, Percutaneous Approach (06/08/2016)  Debridement (eg, high pressure waterjet with/without suction, sharp selective debridement with scissors, scalpel and forceps), open wound, (eg, fibrin, devitalized epidermis and/or dermis, exudate, debris, biofilm), including topical application(s), wound (06/06/2016)  Debridement (eg, high pressure waterjet with/without suction, sharp selective debridement with scissors, scalpel and forceps), open wound, (eg, fibrin, devitalized epidermis and/or dermis, exudate, debris, biofilm), including topical application(s), wound (06/06/2016)  Debridement (eg, high pressure waterjet with/without suction, sharp selective debridement with scissors, scalpel and forceps), open wound, (eg, fibrin, devitalized epidermis and/or dermis, exudate, debris, biofilm), including topical application(s), wound (05/31/2016)  Debridement (eg, high pressure waterjet with/without suction, sharp selective debridement with scissors, scalpel and forceps), open wound, (eg, fibrin, devitalized epidermis and/or dermis, exudate, debris, biofilm), including topical application(s), wound (05/31/2016)  Debridement (eg, high pressure waterjet with/without suction, sharp selective debridement with scissors, scalpel and  forceps), open wound, (eg, fibrin, devitalized epidermis and/or dermis, exudate, debris, biofilm), including topical application(s), wound (05/31/2016)  Debridement (eg, high pressure waterjet with/without suction, sharp selective debridement with scissors, scalpel and forceps), open wound, (eg, fibrin, devitalized epidermis and/or dermis, exudate, debris, biofilm), including topical application(s), wound (05/31/2016)  Debridement (eg, high pressure waterjet with/without suction, sharp selective debridement with scissors, scalpel and forceps), open wound, (eg, fibrin, devitalized epidermis and/or dermis, exudate, debris, biofilm), including topical application(s), wound (05/31/2016)  Debridement (eg, high pressure waterjet with/without suction, sharp selective debridement with scissors, scalpel and forceps), open wound, (eg, fibrin, devitalized epidermis and/or dermis, exudate, debris, biofilm), including topical application(s), wound (05/31/2016)  Excision of Left Lower Leg Skin, External Approach (05/31/2016)  Debridement (eg, high pressure waterjet with/without suction, sharp selective debridement with scissors, scalpel and forceps), open wound, (eg, fibrin, devitalized epidermis and/or dermis, exudate, debris, biofilm), including topical application(s), wound (05/17/2016)  Debridement (eg, high pressure waterjet with/without suction, sharp selective debridement with scissors, scalpel and forceps), open wound, (eg, fibrin, devitalized epidermis and/or dermis, exudate, debris, biofilm), including topical application(s), wound (05/17/2016)  Debridement (eg, high pressure waterjet with/without suction, sharp selective debridement with scissors, scalpel and forceps), open wound, (eg, fibrin, devitalized epidermis and/or dermis, exudate, debris, biofilm), including topical application(s), wound (05/17/2016)  Debridement (eg, high pressure waterjet with/without suction, sharp selective debridement with scissors,  scalpel and forceps), open wound, (eg, fibrin, devitalized epidermis and/or dermis, exudate, debris, biofilm), including topical application(s), wound (05/17/2016)  Debridement (eg, high pressure waterjet with/without suction, sharp selective debridement with scissors, scalpel and forceps), open wound, (eg, fibrin, devitalized epidermis and/or dermis, exudate, debris, biofilm), including topical application(s), wound (05/17/2016)  Debridement (eg, high pressure waterjet with/without suction, sharp selective debridement with scissors, scalpel and forceps), open wound, (eg, fibrin, devitalized epidermis and/or dermis, exudate, debris, biofilm), including topical application(s), wound (05/17/2016)  Excision of Left Foot Skin, External Approach (05/17/2016)  Excision of Left Lower Leg Skin, External Approach (05/17/2016)  Fluoroscopy of Aorta and Bilateral Lower Extremity Arteries using Low Osmolar Contrast (03/09/2016)  Introduction of catheter, aorta (03/09/2016)  Bypass Femoral Tibial (Left) (02/01/2016)  Bypass Left Femoral Artery to Popliteal Artery with Autologous Venous Tissue, Open Approach (02/01/2016)  Excision of Right Greater Saphenous Vein, Percutaneous Endoscopic Approach (02/01/2016)  Insertion of Monitoring Device into Upper Artery, Percutaneous Approach (02/01/2016)  Monitoring of Arterial Pressure, Peripheral, Percutaneous Approach (02/01/2016)  Injection Lumbar Epidural Steroid (., Back) (12/09/2014)  Injection of anesthetic into spinal canal for analgesia (12/09/2014)  Injection of other agent into spinal canal (12/09/2014)  Injection of steroid (12/09/2014)  Injection(s), anesthetic agent and/or steroid, transforaminal epidural, with imaging guidance (fluoroscopy or CT); lumbar or sacral, each additional level (List separately in addition to code for primary procedure). (12/09/2014)  Injection(s), anesthetic agent and/or steroid, transforaminal epidural, with imaging guidance (fluoroscopy or CT);  lumbar or sacral, single level. (12/09/2014)  Other x-ray of lumbosacral spine (12/09/2014)  Injection Lumbar Epidural Steroid (.) (08/12/2014)  Injection of anesthetic into spinal canal for analgesia (08/12/2014)  Injection of other agent into spinal canal (08/12/2014)  Injection of steroid (08/12/2014)  Injection(s), anesthetic agent and/or steroid, transforaminal epidural, with imaging guidance (fluoroscopy or CT); lumbar or sacral, single level. (08/12/2014)  Other x-ray of lumbosacral spine (08/12/2014)  Injection Lumbar Epidural Steroid (., None) (07/03/2014)  Injection of anesthetic into spinal canal for analgesia (07/03/2014)  Injection of other agent into spinal canal (07/03/2014)  Injection of steroid (07/03/2014)  Injection(s), anesthetic agent and/or steroid, transforaminal epidural, with imaging guidance (fluoroscopy or CT); lumbar or sacral, single level. (07/03/2014)  Other x-ray of lumbosacral spine (07/03/2014)  Injection Lumbar Epidural Steroid (.) (04/01/2014)  Injection of anesthetic into spinal canal for analgesia (04/01/2014)  Injection of other agent into spinal canal (04/01/2014)  Injection of steroid (04/01/2014)  Injection(s), anesthetic agent and/or steroid, transforaminal epidural, with imaging guidance (fluoroscopy or CT); lumbar or sacral, single level. (04/01/2014)  Other x-ray of lumbosacral spine (04/01/2014)  Coronary artery bypass, using arterial graft(s) (1999)  Appendectomy; for ruptured appendix with abscess or generalized peritonitis (1992)  hernia (08/06/1975)  AAA (abdominal aortic aneurysm)  CABG - Coronary artery bypass graft  Hemorrhoidectomy   Medications  amoxicillin-clavulanate 875 mg-125 mg oral tablet, 1 tab(s), Oral, BID  carvedilol 25 mg oral tablet, 25 mg= 1 tab(s), Oral, BID  ciprofloxacin 500 mg oral tablet, 500 mg= 1 tab(s), Oral, q12hr, 5 refills  clindamycin 300 mg oral capsule, 300 mg= 1 cap(s), Oral, QID, 5 refills  cyclobenzaprine 10 mg oral tablet, 10 mg=  1 tab(s), Oral, TID, PRN  digoxin 125 mcg (0.125 mg) oral tablet, 125 mcg= 1 tab(s), Oral, Daily  digoxin 250 mcg (0.25 mg) oral tablet, 250 mcg= 1 tab(s), Oral, Daily  Diltia  mg/24 hours oral capsule, extended release, 240 mg= 1 cap(s), Oral, Daily  finasteride 5 mg oral tablet, 5 mg= 1 tab(s), Oral, Daily  Hydrocodone/Apap 10/325, Oral, QID, PRN,? 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 Activity level: Moderate physical work. Highest education level: Some college. Operates hazardous equipment: No., 08/07/2013  Exercise - Does not exercise, 08/07/2013  Home/Environment  Lives with Spouse. Living situation: Home/Independent. Alcohol abuse in household: Yes. Substance abuse in household: No. Smoker in household: Yes., 08/07/2013  Nutrition/Health  Type of diet: 2 meals. Regular, Caffeine intake amount: 2-3 cups of coffee.. Sleeping concerns: Yes. Feels highly stressed: Yes., 08/07/2013  Sexual  Sexually active: No. Number of current partners 1. Sexual orientation: Heterosexual., 08/07/2013  Substance Abuse - Denies Substance Abuse, 08/07/2013  Tobacco - High Risk, 08/07/2013  Former smoker, quit more than 30 days ago, Yes, 03/27/2019  Former smoker, 11/20/2017  Former smoker, 06/08/2016  Immunizations  Vaccine Date Status   influenza virus vaccine, inactivated 11/02/2017 Given   Health Maintenance  Health Maintenance  ???Pending?(in the next year)  ??? ??OverDue  ??? ? ? ?Coronary  Artery Disease Maintenance-Lipid Lowering Therapy due??and every?  ??? ? ? ?Diabetes Screening due??and every?  ??? ? ? ?Aspirin Therapy for CVD Prevention due??11/06/18??and every 1??year(s)  ??? ? ? ?HF-Heart Failure Education due??11/21/18??and every 1??year(s)  ??? ? ? ?ADL Screening due??11/21/18??and every 1??year(s)  ??? ? ? ?Alcohol Misuse Screening due??01/01/19??and every 1??year(s)  ??? ? ? ?Obesity Screening due??01/01/19??and every 1??year(s)  ??? ??Due?  ??? ? ? ?Depression Screening due??05/22/19??and every?  ??? ? ? ?Tetanus Vaccine due??05/22/19??and every 10??year(s)  ??? ? ? ?Zoster Vaccine due??05/22/19??and every 100??year(s)  ??? ??Due In Future?  ??? ? ? ?HF-LVEF not due until??12/06/19??and every 2??year(s)  ??? ? ? ?Hypertension Management-BMP not due until??01/07/20??and every 1??year(s)  ??? ? ? ?Blood Pressure Screening not due until??05/14/20??and every 1??year(s)  ??? ? ? ?Body Mass Index Check not due until??05/14/20??and every 1??year(s)  ??? ? ? ?Hypertension Management-Blood Pressure not due until??05/14/20??and every 1??year(s)  ???Satisfied?(in the past 1 year)  ??? ??Satisfied?  ??? ? ? ?Blood Pressure Screening on??05/22/19.??Satisfied by Jackie Terrazas RN  ??? ? ? ?Hypertension Management-Blood Pressure on??05/22/19.??Satisfied by Jackie Terrazas RN  ?  ?

## 2022-05-03 NOTE — HISTORICAL [facility] CERNER
This is a historical note converted from Emelyn. Formatting and pictures may have been removed.  Please reference Emelyn for original formatting and attached multimedia. Chief Complaint  Left leg wound  History of Present Illness  57 yo left leg wound after fasciotomy procedure.  Review of Systems  Constitutional:?no fever, fatigue, weakness  ENMT: no?nasal congestion/drainage  Respiratory:?no shortness of breath  Cardiovascular:?no chest pain, no edema  Gastrointestinal:?no nausea, vomiting  Hema/Lymph:?no abnormal bruising or bleeding  Musculoskeletal:?no muscle or joint pain, no joint swelling  Integumentary: Left leg wound  ?  ?  ?  Physical Exam  Vitals & Measurements  T:?37.0? ?C (Oral)? HR:?76(Peripheral)? RR:?20? BP:?146/76?  HT:?182?cm? WT:?86?kg?  Incision/Wounds  ?1. Leg Left Other: open wound?- last charted: 04/24/2019 11:41  ?? ??Assessment Done By?- Nurse  ?? ??Dressing Type?- None  ?? ??Dressing Assessment?- Drainage present  ?? ??Dressing Activity?- Changed  ?? ??Cleansing?- Commercial cleansing solution  ?? ??Length?- 10.5 cm  ?? ??Width?- 4.0 cm  ?? ??Exudate Amount?- Moderate  ?? ??Exudate Type?- Serous  ?? ??Status?- Improving  ?   ???? Exhuberant granulation tissue cauterized with silver nitrate around the wound periphery. More necrotic muscle?curette-debrided from the mid-portion of the wound.  ???? Two large segments? of TheraSkin placed on the wound and insinuated the graft into the fascial defects in the wound. Graft dressing applied.  ?  ?  Assessment/Plan  1.?Atherosclerotic peripheral vascular disease?I70.209  2.?Unspecified open wound, left lower leg, subsequent encounter?S81.802D  3.?History of fasciotomy?Z98.890  Rx. transmitted to his pharmacy for Cipro and clindamycin DO NOT CHANGE DRESSING. Clinic to order another graft application for next week. RTC 1 week.   Problem List/Past Medical History  Ongoing  Acute disease or injury-related malnutrition  Unspecified open wound, left lower  leg, subsequent encounter  Historical  ATRIAL FIBRILLATION  CABG - Coronary artery bypass graft  CAD (coronary artery disease)  CHF (congestive heart failure)  HTN (hypertension)  Hyperlipidemia  Neuropathy  PVD (peripheral vascular disease)  Spinal stenosis  Procedure/Surgical History  Application of skin substitute graft to trunk, arms, legs, total wound surface area up to 100 sq cm; each additional 25 sq cm wound surface area, or part thereof (List separately in addition to code for primary procedure) (04/10/2019)  Application of skin substitute graft to trunk, arms, legs, total wound surface area up to 100 sq cm; each additional 25 sq cm wound surface area, or part thereof (List separately in addition to code for primary procedure) (04/10/2019)  Application of skin substitute graft to trunk, arms, legs, total wound surface area up to 100 sq cm; each additional 25 sq cm wound surface area, or part thereof (List separately in addition to code for primary procedure) (04/10/2019)  Application of skin substitute graft to trunk, arms, legs, total wound surface area up to 100 sq cm; first 25 sq cm or less wound surface area (04/10/2019)  Application of skin substitute graft to trunk, arms, legs, total wound surface area up to 100 sq cm; first 25 sq cm or less wound surface area (04/10/2019)  Replacement of Left Lower Leg Skin with Nonautologous Tissue Substitute, Full Thickness, External Approach (04/10/2019)  Surgical preparation or creation of recipient site by excision of open wounds, burn eschar, or scar (including subcutaneous tissues), or incisional release of scar contracture, trunk, arms, legs; first 100 sq cm or 1% of body area of infants and children (04/10/2019)  Theraskin, per square centimeter (04/10/2019)  Theraskin, per square centimeter (04/10/2019)  Replacement of Left Lower Leg Skin with Nonautologous Tissue Substitute, Full Thickness, External Approach (04/03/2019)  Insertion of Infusion Device into  Superior Vena Cava, Percutaneous Approach (11/05/2017)  Ultrasonography of Superior Vena Cava, Guidance (11/05/2017)  Excision of Omentum, Open Approach (11/01/2017)  Hernia Repair Incisional (Right) (11/01/2017)  Repair Right Inguinal Region, Open Approach (11/01/2017)  Transfusion of Nonautologous Red Blood Cells into Peripheral Vein, Percutaneous Approach (10/30/2017)  Abdominal Aortic Aneurysmectomy (.) (10/28/2017)  Dilation of Right External Iliac Artery with Intraluminal Device, Percutaneous Approach (10/28/2017)  Extirpation of Matter from Right External Iliac Artery, Open Approach (10/28/2017)  Plain Radiography of Pelvic Arteries using Low Osmolar Contrast (10/28/2017)  Restriction of Abdominal Aorta with Intraluminal Device, Open Approach (10/28/2017)  Bypass Femoral Popliteal (06/12/2016)  Dilation of Left Femoral Artery with Intraluminal Device, Percutaneous Approach (06/12/2016)  Embolectomy (06/12/2016)  Extirpation of Matter from Left Femoral Artery, Percutaneous Approach (06/12/2016)  Fluoroscopy of Left Lower Extremity Arteries using Low Osmolar Contrast (06/12/2016)  Monitoring of Arterial Pressure, Peripheral, Percutaneous Approach (06/12/2016)  Bypass Femoral Tibial (Left) (06/08/2016)  Bypass Left Femoral Artery to Lower Extremity Artery with Nonautologous Tissue Substitute, Open Approach (06/08/2016)  Monitoring of Arterial Pressure, Peripheral, Percutaneous Approach (06/08/2016)  Debridement (eg, high pressure waterjet with/without suction, sharp selective debridement with scissors, scalpel and forceps), open wound, (eg, fibrin, devitalized epidermis and/or dermis, exudate, debris, biofilm), including topical application(s), wound (06/06/2016)  Debridement (eg, high pressure waterjet with/without suction, sharp selective debridement with scissors, scalpel and forceps), open wound, (eg, fibrin, devitalized epidermis and/or dermis, exudate, debris, biofilm), including topical application(s),  wound (06/06/2016)  Debridement (eg, high pressure waterjet with/without suction, sharp selective debridement with scissors, scalpel and forceps), open wound, (eg, fibrin, devitalized epidermis and/or dermis, exudate, debris, biofilm), including topical application(s), wound (05/31/2016)  Debridement (eg, high pressure waterjet with/without suction, sharp selective debridement with scissors, scalpel and forceps), open wound, (eg, fibrin, devitalized epidermis and/or dermis, exudate, debris, biofilm), including topical application(s), wound (05/31/2016)  Debridement (eg, high pressure waterjet with/without suction, sharp selective debridement with scissors, scalpel and forceps), open wound, (eg, fibrin, devitalized epidermis and/or dermis, exudate, debris, biofilm), including topical application(s), wound (05/31/2016)  Debridement (eg, high pressure waterjet with/without suction, sharp selective debridement with scissors, scalpel and forceps), open wound, (eg, fibrin, devitalized epidermis and/or dermis, exudate, debris, biofilm), including topical application(s), wound (05/31/2016)  Debridement (eg, high pressure waterjet with/without suction, sharp selective debridement with scissors, scalpel and forceps), open wound, (eg, fibrin, devitalized epidermis and/or dermis, exudate, debris, biofilm), including topical application(s), wound (05/31/2016)  Debridement (eg, high pressure waterjet with/without suction, sharp selective debridement with scissors, scalpel and forceps), open wound, (eg, fibrin, devitalized epidermis and/or dermis, exudate, debris, biofilm), including topical application(s), wound (05/31/2016)  Excision of Left Lower Leg Skin, External Approach (05/31/2016)  Debridement (eg, high pressure waterjet with/without suction, sharp selective debridement with scissors, scalpel and forceps), open wound, (eg, fibrin, devitalized epidermis and/or dermis, exudate, debris, biofilm), including topical  application(s), wound (05/17/2016)  Debridement (eg, high pressure waterjet with/without suction, sharp selective debridement with scissors, scalpel and forceps), open wound, (eg, fibrin, devitalized epidermis and/or dermis, exudate, debris, biofilm), including topical application(s), wound (05/17/2016)  Debridement (eg, high pressure waterjet with/without suction, sharp selective debridement with scissors, scalpel and forceps), open wound, (eg, fibrin, devitalized epidermis and/or dermis, exudate, debris, biofilm), including topical application(s), wound (05/17/2016)  Debridement (eg, high pressure waterjet with/without suction, sharp selective debridement with scissors, scalpel and forceps), open wound, (eg, fibrin, devitalized epidermis and/or dermis, exudate, debris, biofilm), including topical application(s), wound (05/17/2016)  Debridement (eg, high pressure waterjet with/without suction, sharp selective debridement with scissors, scalpel and forceps), open wound, (eg, fibrin, devitalized epidermis and/or dermis, exudate, debris, biofilm), including topical application(s), wound (05/17/2016)  Debridement (eg, high pressure waterjet with/without suction, sharp selective debridement with scissors, scalpel and forceps), open wound, (eg, fibrin, devitalized epidermis and/or dermis, exudate, debris, biofilm), including topical application(s), wound (05/17/2016)  Excision of Left Foot Skin, External Approach (05/17/2016)  Excision of Left Lower Leg Skin, External Approach (05/17/2016)  Fluoroscopy of Aorta and Bilateral Lower Extremity Arteries using Low Osmolar Contrast (03/09/2016)  Introduction of catheter, aorta (03/09/2016)  Bypass Femoral Tibial (Left) (02/01/2016)  Bypass Left Femoral Artery to Popliteal Artery with Autologous Venous Tissue, Open Approach (02/01/2016)  Excision of Right Greater Saphenous Vein, Percutaneous Endoscopic Approach (02/01/2016)  Insertion of Monitoring Device into Upper Artery,  Percutaneous Approach (02/01/2016)  Monitoring of Arterial Pressure, Peripheral, Percutaneous Approach (02/01/2016)  Injection Lumbar Epidural Steroid (., Back) (12/09/2014)  Injection of anesthetic into spinal canal for analgesia (12/09/2014)  Injection of other agent into spinal canal (12/09/2014)  Injection of steroid (12/09/2014)  Injection(s), anesthetic agent and/or steroid, transforaminal epidural, with imaging guidance (fluoroscopy or CT); lumbar or sacral, each additional level (List separately in addition to code for primary procedure). (12/09/2014)  Injection(s), anesthetic agent and/or steroid, transforaminal epidural, with imaging guidance (fluoroscopy or CT); lumbar or sacral, single level. (12/09/2014)  Other x-ray of lumbosacral spine (12/09/2014)  Injection Lumbar Epidural Steroid (.) (08/12/2014)  Injection of anesthetic into spinal canal for analgesia (08/12/2014)  Injection of other agent into spinal canal (08/12/2014)  Injection of steroid (08/12/2014)  Injection(s), anesthetic agent and/or steroid, transforaminal epidural, with imaging guidance (fluoroscopy or CT); lumbar or sacral, single level. (08/12/2014)  Other x-ray of lumbosacral spine (08/12/2014)  Injection Lumbar Epidural Steroid (., None) (07/03/2014)  Injection of anesthetic into spinal canal for analgesia (07/03/2014)  Injection of other agent into spinal canal (07/03/2014)  Injection of steroid (07/03/2014)  Injection(s), anesthetic agent and/or steroid, transforaminal epidural, with imaging guidance (fluoroscopy or CT); lumbar or sacral, single level. (07/03/2014)  Other x-ray of lumbosacral spine (07/03/2014)  Injection Lumbar Epidural Steroid (.) (04/01/2014)  Injection of anesthetic into spinal canal for analgesia (04/01/2014)  Injection of other agent into spinal canal (04/01/2014)  Injection of steroid (04/01/2014)  Injection(s), anesthetic agent and/or steroid, transforaminal epidural, with imaging guidance (fluoroscopy or  CT); lumbar or sacral, single level. (04/01/2014)  Other x-ray of lumbosacral spine (04/01/2014)  Coronary artery bypass, using arterial graft(s) (1999)  Appendectomy; for ruptured appendix with abscess or generalized peritonitis (1992)  hernia (08/06/1975)  AAA (abdominal aortic aneurysm)  CABG - Coronary artery bypass graft  Hemorrhoidectomy   Medications  amoxicillin-clavulanate 875 mg-125 mg oral tablet, 1 tab(s), Oral, BID  carvedilol 25 mg oral tablet, 25 mg= 1 tab(s), Oral, BID  ciprofloxacin 500 mg oral tablet, 500 mg= 1 tab(s), Oral, q12hr, 5 refills  clindamycin 150 mg oral capsule, 300 mg= 2 cap(s), Oral, QID  clindamycin 300 mg oral capsule, 300 mg= 1 cap(s), Oral, QID, 5 refills  cyclobenzaprine 10 mg oral tablet, 10 mg= 1 tab(s), Oral, TID, PRN  digoxin 125 mcg (0.125 mg) oral tablet, 125 mcg= 1 tab(s), Oral, Daily  digoxin 250 mcg (0.25 mg) oral tablet, 250 mcg= 1 tab(s), Oral, Daily  Diltia  mg/24 hours oral capsule, extended release, 240 mg= 1 cap(s), Oral, Daily  finasteride 5 mg oral tablet, 5 mg= 1 tab(s), Oral, Daily  Hydrocodone/Apap 10/325, Oral, QID, PRN,? ?Still taking, not as prescribed: taking 5 times a day  isosorbide MONOnitrate 30 mg oral tablet, Extended Release, 30 mg= 1 tab(s), Oral, qAM,? ?Not Taking per Prescriber  lisinopril 10 mg oral tablet, 10 mg= 1 tab(s), Oral, Daily  Livalo, 2 mg, Oral, Daily  Livalo 4 mg oral tablet, 4 mg= 1 tab(s), Oral, qPM  morphine 30 mg/8 hr oral tablet, extended release, 30 mg= 1 tab(s), Oral, BID,? ?Not Taking per Prescriber  Pantoprazole 40 mg ORAL EC-Tablet, 40 mg= 1 tab(s), Oral, Daily  traZODONE 50 mg oral tablet ( Desyrel ), 50 mg= 1 tab(s), Oral, qPM  Xarelto 20mg Tablet, 20 mg= 1 tab(s), Oral, Once  Zosyn, 3.375 gm, IV Piggyback, TID,? 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 Activity level: Moderate physical work. Highest education level: Some college. Operates hazardous equipment: No., 08/07/2013  Exercise - Does not exercise, 08/07/2013  Home/Environment  Lives with Spouse. Living situation: Home/Independent. Alcohol abuse in household: Yes. Substance abuse in household: No. Smoker in household: Yes., 08/07/2013  Nutrition/Health  Type of diet: 2 meals. Regular, Caffeine intake amount: 2-3 cups of coffee.. Sleeping concerns: Yes. Feels highly stressed: Yes., 08/07/2013  Sexual  Sexually active: No. Number of current partners 1. Sexual orientation: Heterosexual., 08/07/2013  Substance Abuse - Denies Substance Abuse, 08/07/2013  Tobacco - High Risk, 08/07/2013  Former smoker, quit more than 30 days ago, Yes, 03/27/2019  Former smoker, 11/20/2017  Former smoker, 06/08/2016  Immunizations  Vaccine Date Status   influenza virus vaccine, inactivated 11/02/2017 Given   Health Maintenance  Health Maintenance  ???Pending?(in the next year)  ??? ??OverDue  ??? ? ? ?Coronary Artery Disease Maintenance-Lipid Lowering Therapy due??and every?  ??? ? ? ?Diabetes Screening due??and every?  ??? ? ? ?Obesity Screening due??11/05/18??and every 1??year(s)  ??? ? ? ?Aspirin Therapy for CVD Prevention due??11/06/18??and every 1??year(s)  ??? ? ? ?HF-Heart Failure Education due??11/21/18??and every 1??year(s)  ??? ? ? ?ADL Screening due??11/21/18??and every 1??year(s)  ??? ??Due?  ??? ? ? ?Alcohol Misuse Screening due??04/24/19??and every 1??year(s)  ??? ? ? ?Depression Screening due??04/24/19??and every?  ??? ? ? ?Tetanus Vaccine due??04/24/19??and every 10??year(s)  ??? ? ? ?Zoster Vaccine due??04/24/19??and every 100??year(s)  ??? ??Due In Future?  ??? ? ? ?HF-LVEF not due until??12/06/19??and every 2??year(s)  ??? ? ? ?Hypertension Management-BMP not due until??01/07/20??and every 1??year(s)  ??? ? ? ?Blood Pressure Screening not due until??04/16/20??and every 1??year(s)  ??? ? ?  ?Body Mass Index Check not due until??04/16/20??and every 1??year(s)  ??? ? ? ?Hypertension Management-Blood Pressure not due until??04/16/20??and every 1??year(s)  ???Satisfied?(in the past 1 year)  ??? ??Satisfied?  ??? ? ? ?Blood Pressure Screening on??04/24/19.??Satisfied by Jackie Terrazas RN  ??? ? ? ?Hypertension Management-Blood Pressure on??04/24/19.??Satisfied by Jackie Terrazas RN  ?  ?

## 2022-05-03 NOTE — HISTORICAL [facility]ERNER
This is a historical note converted from Emelyn. Formatting and pictures may have been removed.  Please reference Emelyn for original formatting and attached multimedia. Chief Complaint  leg wound  History of Present Illness  59 yo male with a left leg open?wound . TheraSkin intact.  Review of Systems  Constitutional:?no fever, fatigue, weakness  ENMT:?no?nasal congestion/drainage  Respiratory:?no couch, no shortness of breath  Cardiovascular:?no chest pain, no palpitations, no edema  Gastrointestinal:?no nausea, vomiting  Hema/Lymph:?no abnormal bruising or bleeding  Musculoskeletal:?no muscle or joint pain, no joint swelling  Integumentary:left leg wound?  ?  ?  Physical Exam  Vitals & Measurements  T:?36.5? ?C (Oral)? HR:?76(Peripheral)? RR:?20? BP:?142/82?  HT:?183?cm? WT:?93.9?kg?  Incision/Wounds  ?1. Leg Left Other: open wound?- last charted: 05/15/2019 09:27  ?? ??Assessment Done By?- Wound Care Team  ?? ??Abnormality Pattern?- Gaping  ?? ??Abnormality Color?- Red  ?? ??Pressure Point?- Bony prominence  ?? ??Dressing Type?- Rolled Gauze  ?? ??Dressing Assessment?- Drainage present  ?? ??Dressing Activity?- Changed  ?? ??Cleansing?- Commercial cleansing solution  ?? ??Length?- 8.0 cm  ?? ??Width?- 3.0 cm  ?? ??Depth?- 0.5 cm  ?? ??Wound Bed Tissue Type?- Granulating  ?? ??Percent Granulated?- 50 %  ?? ??Percent Other Tissue?- 50 %  ?? ??Undermining Depth?- 1.0  ?? ??Tunneling Location?- 9oclock  ?? ??Exudate Amount?- Moderate  ?? ??Exudate Type?- Serosanguineous  ?? ??Exudate Odor?- None  ?? ??Edge?- Unattached to wound bed  ?? ??Surrounding Tissue Color?- Normal  ?? ??Status?- Improving  ?   ?????The wound is measuring smaller.? Necrotic tendon is excised with forceps and scissors.? A small pocket of liquified material is unroofed in the process.? The?material is swabbed for [facility] C&S and the cavity is swabbed out with dry?gauze.? A?large?TheraSkin graft is divided in? half, covering the wound completey with  the two halves.? The fenestrations in the graft over the pocket are connected by incising the interconnecting skin and then small pieces of TheraSkin from the edges of the graft are placed in the pocket.? The graft is cemented in place, followed by placement of a graft dressing.  ?  ?  ?  ?  Assessment/Plan  1.?Traumatic open wound of left lower leg with delayed healing?S81.802D  2.?History of fasciotomy?Z98.890  3.?Atherosclerotic peripheral vascular disease?I70.209  DO NOT CHANGE DRESSING. Clinic to order another application of theraskin for next week. RTC 1 week   Problem List/Past Medical History  Ongoing  Acute disease or injury-related malnutrition  History of fasciotomy  Unspecified open wound, left lower leg, subsequent encounter  Historical  ATRIAL FIBRILLATION  CABG - Coronary artery bypass graft  CAD (coronary artery disease)  CHF (congestive heart failure)  HTN (hypertension)  Hyperlipidemia  Neuropathy  PVD (peripheral vascular disease)  Spinal stenosis  Procedure/Surgical History  Application of skin substitute graft to trunk, arms, legs, total wound surface area up to 100 sq cm; each additional 25 sq cm wound surface area, or part thereof (List separately in addition to code for primary procedure) (05/01/2019)  Application of skin substitute graft to trunk, arms, legs, total wound surface area up to 100 sq cm; first 25 sq cm or less wound surface area (05/01/2019)  Replacement of Left Lower Leg Skin with Nonautologous Tissue Substitute, Full Thickness, External Approach (05/01/2019)  Theraskin, per square centimeter (05/01/2019)  Application of skin substitute graft to trunk, arms, legs, total wound surface area up to 100 sq cm; each additional 25 sq cm wound surface area, or part thereof (List separately in addition to code for primary procedure) (04/24/2019)  Application of skin substitute graft to trunk, arms, legs, total wound surface area up to 100 sq cm; first 25 sq cm or less wound surface area  (04/24/2019)  Replacement of Left Lower Leg Skin with Nonautologous Tissue Substitute, Full Thickness, External Approach (04/24/2019)  Theraskin, per square centimeter (04/24/2019)  Application of skin substitute graft to trunk, arms, legs, total wound surface area up to 100 sq cm; each additional 25 sq cm wound surface area, or part thereof (List separately in addition to code for primary procedure) (04/17/2019)  Application of skin substitute graft to trunk, arms, legs, total wound surface area up to 100 sq cm; each additional 25 sq cm wound surface area, or part thereof (List separately in addition to code for primary procedure) (04/17/2019)  Application of skin substitute graft to trunk, arms, legs, total wound surface area up to 100 sq cm; first 25 sq cm or less wound surface area (04/17/2019)  Replacement of Left Lower Leg Skin with Nonautologous Tissue Substitute, Full Thickness, External Approach (04/17/2019)  Theraskin, per square centimeter (04/17/2019)  Application of skin substitute graft to trunk, arms, legs, total wound surface area up to 100 sq cm; each additional 25 sq cm wound surface area, or part thereof (List separately in addition to code for primary procedure) (04/10/2019)  Application of skin substitute graft to trunk, arms, legs, total wound surface area up to 100 sq cm; each additional 25 sq cm wound surface area, or part thereof (List separately in addition to code for primary procedure) (04/10/2019)  Application of skin substitute graft to trunk, arms, legs, total wound surface area up to 100 sq cm; each additional 25 sq cm wound surface area, or part thereof (List separately in addition to code for primary procedure) (04/10/2019)  Application of skin substitute graft to trunk, arms, legs, total wound surface area up to 100 sq cm; first 25 sq cm or less wound surface area (04/10/2019)  Application of skin substitute graft to trunk, arms, legs, total wound surface area up to 100 sq cm;  first 25 sq cm or less wound surface area (04/10/2019)  Replacement of Left Lower Leg Skin with Nonautologous Tissue Substitute, Full Thickness, External Approach (04/10/2019)  Surgical preparation or creation of recipient site by excision of open wounds, burn eschar, or scar (including subcutaneous tissues), or incisional release of scar contracture, trunk, arms, legs; first 100 sq cm or 1% of body area of infants and children (04/10/2019)  Theraskin, per square centimeter (04/10/2019)  Theraskin, per square centimeter (04/10/2019)  Replacement of Left Lower Leg Skin with Nonautologous Tissue Substitute, Full Thickness, External Approach (04/03/2019)  Insertion of Infusion Device into Superior Vena Cava, Percutaneous Approach (11/05/2017)  Ultrasonography of Superior Vena Cava, Guidance (11/05/2017)  Excision of Omentum, Open Approach (11/01/2017)  Hernia Repair Incisional (Right) (11/01/2017)  Repair Right Inguinal Region, Open Approach (11/01/2017)  Transfusion of Nonautologous Red Blood Cells into Peripheral Vein, Percutaneous Approach (10/30/2017)  Abdominal Aortic Aneurysmectomy (.) (10/28/2017)  Dilation of Right External Iliac Artery with Intraluminal Device, Percutaneous Approach (10/28/2017)  Extirpation of Matter from Right External Iliac Artery, Open Approach (10/28/2017)  Plain Radiography of Pelvic Arteries using Low Osmolar Contrast (10/28/2017)  Restriction of Abdominal Aorta with Intraluminal Device, Open Approach (10/28/2017)  Bypass Femoral Popliteal (06/12/2016)  Dilation of Left Femoral Artery with Intraluminal Device, Percutaneous Approach (06/12/2016)  Embolectomy (06/12/2016)  Extirpation of Matter from Left Femoral Artery, Percutaneous Approach (06/12/2016)  Fluoroscopy of Left Lower Extremity Arteries using Low Osmolar Contrast (06/12/2016)  Monitoring of Arterial Pressure, Peripheral, Percutaneous Approach (06/12/2016)  Bypass Femoral Tibial (Left) (06/08/2016)  Bypass Left Femoral Artery  to Lower Extremity Artery with Nonautologous Tissue Substitute, Open Approach (06/08/2016)  Monitoring of Arterial Pressure, Peripheral, Percutaneous Approach (06/08/2016)  Debridement (eg, high pressure waterjet with/without suction, sharp selective debridement with scissors, scalpel and forceps), open wound, (eg, fibrin, devitalized epidermis and/or dermis, exudate, debris, biofilm), including topical application(s), wound (06/06/2016)  Debridement (eg, high pressure waterjet with/without suction, sharp selective debridement with scissors, scalpel and forceps), open wound, (eg, fibrin, devitalized epidermis and/or dermis, exudate, debris, biofilm), including topical application(s), wound (06/06/2016)  Debridement (eg, high pressure waterjet with/without suction, sharp selective debridement with scissors, scalpel and forceps), open wound, (eg, fibrin, devitalized epidermis and/or dermis, exudate, debris, biofilm), including topical application(s), wound (05/31/2016)  Debridement (eg, high pressure waterjet with/without suction, sharp selective debridement with scissors, scalpel and forceps), open wound, (eg, fibrin, devitalized epidermis and/or dermis, exudate, debris, biofilm), including topical application(s), wound (05/31/2016)  Debridement (eg, high pressure waterjet with/without suction, sharp selective debridement with scissors, scalpel and forceps), open wound, (eg, fibrin, devitalized epidermis and/or dermis, exudate, debris, biofilm), including topical application(s), wound (05/31/2016)  Debridement (eg, high pressure waterjet with/without suction, sharp selective debridement with scissors, scalpel and forceps), open wound, (eg, fibrin, devitalized epidermis and/or dermis, exudate, debris, biofilm), including topical application(s), wound (05/31/2016)  Debridement (eg, high pressure waterjet with/without suction, sharp selective debridement with scissors, scalpel and forceps), open wound, (eg, fibrin,  devitalized epidermis and/or dermis, exudate, debris, biofilm), including topical application(s), wound (05/31/2016)  Debridement (eg, high pressure waterjet with/without suction, sharp selective debridement with scissors, scalpel and forceps), open wound, (eg, fibrin, devitalized epidermis and/or dermis, exudate, debris, biofilm), including topical application(s), wound (05/31/2016)  Excision of Left Lower Leg Skin, External Approach (05/31/2016)  Debridement (eg, high pressure waterjet with/without suction, sharp selective debridement with scissors, scalpel and forceps), open wound, (eg, fibrin, devitalized epidermis and/or dermis, exudate, debris, biofilm), including topical application(s), wound (05/17/2016)  Debridement (eg, high pressure waterjet with/without suction, sharp selective debridement with scissors, scalpel and forceps), open wound, (eg, fibrin, devitalized epidermis and/or dermis, exudate, debris, biofilm), including topical application(s), wound (05/17/2016)  Debridement (eg, high pressure waterjet with/without suction, sharp selective debridement with scissors, scalpel and forceps), open wound, (eg, fibrin, devitalized epidermis and/or dermis, exudate, debris, biofilm), including topical application(s), wound (05/17/2016)  Debridement (eg, high pressure waterjet with/without suction, sharp selective debridement with scissors, scalpel and forceps), open wound, (eg, fibrin, devitalized epidermis and/or dermis, exudate, debris, biofilm), including topical application(s), wound (05/17/2016)  Debridement (eg, high pressure waterjet with/without suction, sharp selective debridement with scissors, scalpel and forceps), open wound, (eg, fibrin, devitalized epidermis and/or dermis, exudate, debris, biofilm), including topical application(s), wound (05/17/2016)  Debridement (eg, high pressure waterjet with/without suction, sharp selective debridement with scissors, scalpel and forceps), open wound, (eg,  fibrin, devitalized epidermis and/or dermis, exudate, debris, biofilm), including topical application(s), wound (05/17/2016)  Excision of Left Foot Skin, External Approach (05/17/2016)  Excision of Left Lower Leg Skin, External Approach (05/17/2016)  Fluoroscopy of Aorta and Bilateral Lower Extremity Arteries using Low Osmolar Contrast (03/09/2016)  Introduction of catheter, aorta (03/09/2016)  Bypass Femoral Tibial (Left) (02/01/2016)  Bypass Left Femoral Artery to Popliteal Artery with Autologous Venous Tissue, Open Approach (02/01/2016)  Excision of Right Greater Saphenous Vein, Percutaneous Endoscopic Approach (02/01/2016)  Insertion of Monitoring Device into Upper Artery, Percutaneous Approach (02/01/2016)  Monitoring of Arterial Pressure, Peripheral, Percutaneous Approach (02/01/2016)  Injection Lumbar Epidural Steroid (., Back) (12/09/2014)  Injection of anesthetic into spinal canal for analgesia (12/09/2014)  Injection of other agent into spinal canal (12/09/2014)  Injection of steroid (12/09/2014)  Injection(s), anesthetic agent and/or steroid, transforaminal epidural, with imaging guidance (fluoroscopy or CT); lumbar or sacral, each additional level (List separately in addition to code for primary procedure). (12/09/2014)  Injection(s), anesthetic agent and/or steroid, transforaminal epidural, with imaging guidance (fluoroscopy or CT); lumbar or sacral, single level. (12/09/2014)  Other x-ray of lumbosacral spine (12/09/2014)  Injection Lumbar Epidural Steroid (.) (08/12/2014)  Injection of anesthetic into spinal canal for analgesia (08/12/2014)  Injection of other agent into spinal canal (08/12/2014)  Injection of steroid (08/12/2014)  Injection(s), anesthetic agent and/or steroid, transforaminal epidural, with imaging guidance (fluoroscopy or CT); lumbar or sacral, single level. (08/12/2014)  Other x-ray of lumbosacral spine (08/12/2014)  Injection Lumbar Epidural Steroid (., None)  (07/03/2014)  Injection of anesthetic into spinal canal for analgesia (07/03/2014)  Injection of other agent into spinal canal (07/03/2014)  Injection of steroid (07/03/2014)  Injection(s), anesthetic agent and/or steroid, transforaminal epidural, with imaging guidance (fluoroscopy or CT); lumbar or sacral, single level. (07/03/2014)  Other x-ray of lumbosacral spine (07/03/2014)  Injection Lumbar Epidural Steroid (.) (04/01/2014)  Injection of anesthetic into spinal canal for analgesia (04/01/2014)  Injection of other agent into spinal canal (04/01/2014)  Injection of steroid (04/01/2014)  Injection(s), anesthetic agent and/or steroid, transforaminal epidural, with imaging guidance (fluoroscopy or CT); lumbar or sacral, single level. (04/01/2014)  Other x-ray of lumbosacral spine (04/01/2014)  Coronary artery bypass, using arterial graft(s) (1999)  Appendectomy; for ruptured appendix with abscess or generalized peritonitis (1992)  hernia (08/06/1975)  AAA (abdominal aortic aneurysm)  Hemorrhoidectomy   Medications  amoxicillin-clavulanate 875 mg-125 mg oral tablet, 1 tab(s), Oral, BID  carvedilol 25 mg oral tablet, 25 mg= 1 tab(s), Oral, BID  ciprofloxacin 500 mg oral tablet, 500 mg= 1 tab(s), Oral, q12hr, 5 refills  clindamycin 300 mg oral capsule, 300 mg= 1 cap(s), Oral, QID, 5 refills  cyclobenzaprine 10 mg oral tablet, 10 mg= 1 tab(s), Oral, TID, PRN  digoxin 125 mcg (0.125 mg) oral tablet, 125 mcg= 1 tab(s), Oral, Daily  digoxin 250 mcg (0.25 mg) oral tablet, 250 mcg= 1 tab(s), Oral, Daily  Diltia  mg/24 hours oral capsule, extended release, 240 mg= 1 cap(s), Oral, Daily  finasteride 5 mg oral tablet, 5 mg= 1 tab(s), Oral, Daily  lisinopril 10 mg oral tablet, 10 mg= 1 tab(s), Oral, Daily  Livalo, 2 mg, Oral, Daily  Livalo 4 mg oral tablet, 4 mg= 1 tab(s), Oral, qPM  Pantoprazole 40 mg ORAL EC-Tablet, 40 mg= 1 tab(s), Oral, Daily  traZODONE 50 mg oral tablet ( Desyrel ), 50 mg= 1 tab(s), Oral,  qPM  Allergies  No Known Allergies  Social History  Alcohol - High Risk, 08/07/2013  Never, 03/27/2019  Past, 08/08/2014  Employment/School  Employed, 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 Activity level: Moderate physical work. Highest education level: Some college. Operates hazardous equipment: No., 08/07/2013  Exercise - Does not exercise, 08/07/2013  Home/Environment  Lives with Spouse. Living situation: Home/Independent. Alcohol abuse in household: Yes. Substance abuse in household: No. Smoker in household: Yes., 08/07/2013  Nutrition/Health  Type of diet: 2 meals. Regular, Caffeine intake amount: 2-3 cups of coffee.. Sleeping concerns: Yes. Feels highly stressed: Yes., 08/07/2013  Sexual  Sexually active: No. Number of current partners 1. Sexual orientation: Heterosexual., 08/07/2013  Substance Abuse - Denies Substance Abuse, 08/07/2013  Tobacco - High Risk, 08/07/2013  Former smoker, quit more than 30 days ago, Yes, 03/27/2019  Former smoker, 11/20/2017  Former smoker, 06/08/2016  Immunizations  Vaccine Date Status   influenza virus vaccine, inactivated 11/02/2017 Given   Health Maintenance  Health Maintenance  ???Pending?(in the next year)  ??? ??OverDue  ??? ? ? ?Coronary Artery Disease Maintenance-Lipid Lowering Therapy due??and every?  ??? ? ? ?Diabetes Screening due??and every?  ??? ? ? ?Aspirin Therapy for CVD Prevention due??11/06/18??and every 1??year(s)  ??? ? ? ?HF-Heart Failure Education due??11/21/18??and every 1??year(s)  ??? ? ? ?ADL Screening due??11/21/18??and every 1??year(s)  ??? ? ? ?Alcohol Misuse Screening due??01/01/19??and every 1??year(s)  ??? ? ? ?Obesity Screening due??01/01/19??and every 1??year(s)  ??? ??Due?  ??? ? ? ?Depression Screening due??05/15/19??and every?  ??? ? ? ?Tetanus Vaccine due??05/15/19??and every 10??year(s)  ??? ? ? ?Zoster Vaccine due??05/15/19??and every 100??year(s)  ??? ??Due In Future?  ??? ? ? ?HF-LVEF not due until??12/06/19??and every  2??year(s)  ??? ? ? ?Hypertension Management-BMP not due until??01/07/20??and every 1??year(s)  ??? ? ? ?Blood Pressure Screening not due until??05/07/20??and every 1??year(s)  ??? ? ? ?Body Mass Index Check not due until??05/07/20??and every 1??year(s)  ??? ? ? ?Hypertension Management-Blood Pressure not due until??05/07/20??and every 1??year(s)  ???Satisfied?(in the past 1 year)  ??? ??Satisfied?  ??? ? ? ?Blood Pressure Screening on??05/15/19.??Satisfied by Neal Mcfadden  ??? ? ? ?Hypertension Management-Blood Pressure on??05/15/19.??Satisfied by Neal Mcfadden  ?  ?

## 2022-05-03 NOTE — HISTORICAL [facility]ERNER
This is a historical note converted from Emelyn. Formatting and pictures may have been removed.  Please reference Emelyn for original formatting and attached multimedia. Chief Complaint  left leg wound  History of Present Illness  57 yo male with a left leg wound. Patient currently applying TheraSkin to wound with a graft dressing.  Review of Systems  Constitutional:?no fever, fatigue, weakness  ENMT:?no?nasal congestion/drainage  Respiratory:?no couch, no shortness of breath  Cardiovascular:?no chest pain, no palpitations, no edema  Gastrointestinal:?no nausea, vomiting  Hema/Lymph:?no abnormal bruising or bleeding  Musculoskeletal:?no muscle or joint pain, no joint swelling  Integumentary:?left leg wound  ?  ?  Physical Exam  Vitals & Measurements  T:?36.8? ?C (Oral)? HR:?80(Peripheral)? RR:?20? BP:?140/80?  HT:?182?cm? WT:?86?kg?  Incision/Wounds  ?1. Leg Left Other: open wound?- last charted: 04/10/2019 09:52  ?? ??Assessment Done By?- Wound Care Team  ?? ??Dressing Type?- Skin adhesive, Steri-strips  ?? ??Dressing Assessment?- Drainage present  ?? ??Dressing Activity?- Changed  ?? ??Length?- 12 cm  ?? ??Width?- 4.2 cm  ?? ??Depth?- 0.4 cm  ?? ??Percent Granulated?- 50 %  ?? ??Percent Necrotic Tissue Slough?- 15 %  ?? ??Exudate Amount?- Moderate  ?? ??Exudate Type?- Serosanguineous  ?? ??Exudate Odor?- None  ?? ??Edge?- Attached to wound bed  ?? ??Surrounding Tissue Color?- Normal  ?? ??Status?- Improving  ?   ???? Exuberant granulation tissue has formed under the influence of?TheraSkin grafting #1.? Devitalized?anterior compartment musculature is curette debrided from the wound.? Silver nitrate is applied to the exuberant granulation?tissue at the wound margin.? Two large sections of?TheraSkin are applied to completely cover the wound and these are cemented in place with a graft dressing.  ?  ?  Assessment/Plan  1.?Unspecified open wound, left lower leg, subsequent encounter  2.?Peripheral vascular disease  DO  NOT CHANGE DRESSING. RTC 1 week. Clinic to order thera skin graft for next week.   Problem List/Past Medical History  Ongoing  Acute disease or injury-related malnutrition  Unspecified open wound, left lower leg, subsequent encounter  Historical  ATRIAL FIBRILLATION  CABG - Coronary artery bypass graft  CAD (coronary artery disease)  CHF (congestive heart failure)  HTN (hypertension)  Hyperlipidemia  Neuropathy  PVD (peripheral vascular disease)  Spinal stenosis  Procedure/Surgical History  Insertion of Infusion Device into Superior Vena Cava, Percutaneous Approach (11/05/2017)  Ultrasonography of Superior Vena Cava, Guidance (11/05/2017)  Excision of Omentum, Open Approach (11/01/2017)  Hernia Repair Incisional (Right) (11/01/2017)  Repair Right Inguinal Region, Open Approach (11/01/2017)  Transfusion of Nonautologous Red Blood Cells into Peripheral Vein, Percutaneous Approach (10/30/2017)  Abdominal Aortic Aneurysmectomy (.) (10/28/2017)  Dilation of Right External Iliac Artery with Intraluminal Device, Percutaneous Approach (10/28/2017)  Extirpation of Matter from Right External Iliac Artery, Open Approach (10/28/2017)  Plain Radiography of Pelvic Arteries using Low Osmolar Contrast (10/28/2017)  Restriction of Abdominal Aorta with Intraluminal Device, Open Approach (10/28/2017)  Bypass Femoral Popliteal (06/12/2016)  Dilation of Left Femoral Artery with Intraluminal Device, Percutaneous Approach (06/12/2016)  Embolectomy (06/12/2016)  Extirpation of Matter from Left Femoral Artery, Percutaneous Approach (06/12/2016)  Fluoroscopy of Left Lower Extremity Arteries using Low Osmolar Contrast (06/12/2016)  Monitoring of Arterial Pressure, Peripheral, Percutaneous Approach (06/12/2016)  Bypass Femoral Tibial (Left) (06/08/2016)  Bypass Left Femoral Artery to Lower Extremity Artery with Nonautologous Tissue Substitute, Open Approach (06/08/2016)  Monitoring of Arterial Pressure, Peripheral, Percutaneous Approach  (06/08/2016)  Debridement (eg, high pressure waterjet with/without suction, sharp selective debridement with scissors, scalpel and forceps), open wound, (eg, fibrin, devitalized epidermis and/or dermis, exudate, debris, biofilm), including topical application(s), wound (06/06/2016)  Debridement (eg, high pressure waterjet with/without suction, sharp selective debridement with scissors, scalpel and forceps), open wound, (eg, fibrin, devitalized epidermis and/or dermis, exudate, debris, biofilm), including topical application(s), wound (06/06/2016)  Debridement (eg, high pressure waterjet with/without suction, sharp selective debridement with scissors, scalpel and forceps), open wound, (eg, fibrin, devitalized epidermis and/or dermis, exudate, debris, biofilm), including topical application(s), wound (05/31/2016)  Debridement (eg, high pressure waterjet with/without suction, sharp selective debridement with scissors, scalpel and forceps), open wound, (eg, fibrin, devitalized epidermis and/or dermis, exudate, debris, biofilm), including topical application(s), wound (05/31/2016)  Debridement (eg, high pressure waterjet with/without suction, sharp selective debridement with scissors, scalpel and forceps), open wound, (eg, fibrin, devitalized epidermis and/or dermis, exudate, debris, biofilm), including topical application(s), wound (05/31/2016)  Debridement (eg, high pressure waterjet with/without suction, sharp selective debridement with scissors, scalpel and forceps), open wound, (eg, fibrin, devitalized epidermis and/or dermis, exudate, debris, biofilm), including topical application(s), wound (05/31/2016)  Debridement (eg, high pressure waterjet with/without suction, sharp selective debridement with scissors, scalpel and forceps), open wound, (eg, fibrin, devitalized epidermis and/or dermis, exudate, debris, biofilm), including topical application(s), wound (05/31/2016)  Debridement (eg, high pressure waterjet  with/without suction, sharp selective debridement with scissors, scalpel and forceps), open wound, (eg, fibrin, devitalized epidermis and/or dermis, exudate, debris, biofilm), including topical application(s), wound (05/31/2016)  Excision of Left Lower Leg Skin, External Approach (05/31/2016)  Debridement (eg, high pressure waterjet with/without suction, sharp selective debridement with scissors, scalpel and forceps), open wound, (eg, fibrin, devitalized epidermis and/or dermis, exudate, debris, biofilm), including topical application(s), wound (05/17/2016)  Debridement (eg, high pressure waterjet with/without suction, sharp selective debridement with scissors, scalpel and forceps), open wound, (eg, fibrin, devitalized epidermis and/or dermis, exudate, debris, biofilm), including topical application(s), wound (05/17/2016)  Debridement (eg, high pressure waterjet with/without suction, sharp selective debridement with scissors, scalpel and forceps), open wound, (eg, fibrin, devitalized epidermis and/or dermis, exudate, debris, biofilm), including topical application(s), wound (05/17/2016)  Debridement (eg, high pressure waterjet with/without suction, sharp selective debridement with scissors, scalpel and forceps), open wound, (eg, fibrin, devitalized epidermis and/or dermis, exudate, debris, biofilm), including topical application(s), wound (05/17/2016)  Debridement (eg, high pressure waterjet with/without suction, sharp selective debridement with scissors, scalpel and forceps), open wound, (eg, fibrin, devitalized epidermis and/or dermis, exudate, debris, biofilm), including topical application(s), wound (05/17/2016)  Debridement (eg, high pressure waterjet with/without suction, sharp selective debridement with scissors, scalpel and forceps), open wound, (eg, fibrin, devitalized epidermis and/or dermis, exudate, debris, biofilm), including topical application(s), wound (05/17/2016)  Excision of Left Foot Skin,  External Approach (05/17/2016)  Excision of Left Lower Leg Skin, External Approach (05/17/2016)  Fluoroscopy of Aorta and Bilateral Lower Extremity Arteries using Low Osmolar Contrast (03/09/2016)  Introduction of catheter, aorta (03/09/2016)  Bypass Femoral Tibial (Left) (02/01/2016)  Bypass Left Femoral Artery to Popliteal Artery with Autologous Venous Tissue, Open Approach (02/01/2016)  Excision of Right Greater Saphenous Vein, Percutaneous Endoscopic Approach (02/01/2016)  Insertion of Monitoring Device into Upper Artery, Percutaneous Approach (02/01/2016)  Monitoring of Arterial Pressure, Peripheral, Percutaneous Approach (02/01/2016)  Injection Lumbar Epidural Steroid (., Back) (12/09/2014)  Injection of anesthetic into spinal canal for analgesia (12/09/2014)  Injection of other agent into spinal canal (12/09/2014)  Injection of steroid (12/09/2014)  Injection(s), anesthetic agent and/or steroid, transforaminal epidural, with imaging guidance (fluoroscopy or CT); lumbar or sacral, each additional level (List separately in addition to code for primary procedure). (12/09/2014)  Injection(s), anesthetic agent and/or steroid, transforaminal epidural, with imaging guidance (fluoroscopy or CT); lumbar or sacral, single level. (12/09/2014)  Other x-ray of lumbosacral spine (12/09/2014)  Injection Lumbar Epidural Steroid (.) (08/12/2014)  Injection of anesthetic into spinal canal for analgesia (08/12/2014)  Injection of other agent into spinal canal (08/12/2014)  Injection of steroid (08/12/2014)  Injection(s), anesthetic agent and/or steroid, transforaminal epidural, with imaging guidance (fluoroscopy or CT); lumbar or sacral, single level. (08/12/2014)  Other x-ray of lumbosacral spine (08/12/2014)  Injection Lumbar Epidural Steroid (., None) (07/03/2014)  Injection of anesthetic into spinal canal for analgesia (07/03/2014)  Injection of other agent into spinal canal (07/03/2014)  Injection of steroid  (07/03/2014)  Injection(s), anesthetic agent and/or steroid, transforaminal epidural, with imaging guidance (fluoroscopy or CT); lumbar or sacral, single level. (07/03/2014)  Other x-ray of lumbosacral spine (07/03/2014)  Injection Lumbar Epidural Steroid (.) (04/01/2014)  Injection of anesthetic into spinal canal for analgesia (04/01/2014)  Injection of other agent into spinal canal (04/01/2014)  Injection of steroid (04/01/2014)  Injection(s), anesthetic agent and/or steroid, transforaminal epidural, with imaging guidance (fluoroscopy or CT); lumbar or sacral, single level. (04/01/2014)  Other x-ray of lumbosacral spine (04/01/2014)  Coronary artery bypass, using arterial graft(s) (1999)  Appendectomy; for ruptured appendix with abscess or generalized peritonitis (1992)  hernia (08/06/1975)  AAA (abdominal aortic aneurysm)  Hemorrhoidectomy   Medications  amoxicillin-clavulanate 875 mg-125 mg oral tablet, 1 tab(s), Oral, BID  carvedilol 25 mg oral tablet, 25 mg= 1 tab(s), Oral, BID  ciprofloxacin 500 mg oral tablet, 500 mg= 1 tab(s), Oral, BID,? ?Not Taking per Prescriber  clindamycin 150 mg oral capsule, 300 mg= 2 cap(s), Oral, QID  clindamycin 300 mg oral capsule, 300 mg= 1 cap(s), Oral, TID  cyclobenzaprine 10 mg oral tablet, 10 mg= 1 tab(s), Oral, TID, PRN  digoxin 125 mcg (0.125 mg) oral tablet, 125 mcg= 1 tab(s), Oral, Daily  digoxin 250 mcg (0.25 mg) oral tablet, 250 mcg= 1 tab(s), Oral, Daily  Diltia  mg/24 hours oral capsule, extended release, 240 mg= 1 cap(s), Oral, Daily  finasteride 5 mg oral tablet, 5 mg= 1 tab(s), Oral, Daily  isosorbide MONOnitrate 30 mg oral tablet, Extended Release, 30 mg= 1 tab(s), Oral, qAM,? ?Not Taking per Prescriber  lisinopril 10 mg oral tablet, 10 mg= 1 tab(s), Oral, Daily  Livalo, 2 mg, Oral, Daily  Livalo 4 mg oral tablet, 4 mg= 1 tab(s), Oral, qPM  morphine 30 mg/8 hr oral tablet, extended release, 30 mg= 1 tab(s), Oral, BID,? ?Not Taking per  Prescriber  Pantoprazole 40 mg ORAL EC-Tablet, 40 mg= 1 tab(s), Oral, Daily  traZODONE 50 mg oral tablet ( Desyrel ), 50 mg= 1 tab(s), Oral, qPM  Zosyn, 3.375 gm, IV Piggyback, TID,? 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 Activity level: Moderate physical work. Highest education level: Some college. Operates hazardous equipment: No., 08/07/2013  Exercise - Does not exercise, 08/07/2013  Home/Environment  Lives with Spouse. Living situation: Home/Independent. Alcohol abuse in household: Yes. Substance abuse in household: No. Smoker in household: Yes., 08/07/2013  Nutrition/Health  Type of diet: 2 meals. Regular, Caffeine intake amount: 2-3 cups of coffee.. Sleeping concerns: Yes. Feels highly stressed: Yes., 08/07/2013  Sexual  Sexually active: No. Number of current partners 1. Sexual orientation: Heterosexual., 08/07/2013  Substance Abuse - Denies Substance Abuse, 08/07/2013  Tobacco - High Risk, 08/07/2013  Former smoker, quit more than 30 days ago, Yes, 03/27/2019  Former smoker, 11/20/2017  Former smoker, 06/08/2016  Immunizations  Vaccine Date Status   influenza virus vaccine, inactivated 11/02/2017 Given   Health Maintenance  Health Maintenance  ???Pending?(in the next year)  ??? ??OverDue  ??? ? ? ?Coronary Artery Disease Maintenance-Lipid Lowering Therapy due??and every?  ??? ? ? ?Diabetes Screening due??and every?  ??? ? ? ?Obesity Screening due??11/05/18??and every 1??year(s)  ??? ? ? ?Aspirin Therapy for CVD Prevention due??11/06/18??and every 1??year(s)  ??? ? ? ?HF-Heart Failure Education due??11/21/18??and every 1??year(s)  ??? ? ? ?ADL Screening due??11/21/18??and every 1??year(s)  ??? ??Due?  ??? ? ? ?Alcohol Misuse Screening due??04/10/19??and every 1??year(s)  ??? ? ? ?Depression Screening due??04/10/19??and  every?  ??? ? ? ?Tetanus Vaccine due??04/10/19??and every 10??year(s)  ??? ? ? ?Zoster Vaccine due??04/10/19??and every 100??year(s)  ??? ??Due In Future?  ??? ? ? ?HF-LVEF not due until??12/06/19??and every 2??year(s)  ??? ? ? ?Hypertension Management-BMP not due until??01/07/20??and every 1??year(s)  ??? ? ? ?Blood Pressure Screening not due until??04/04/20??and every 1??year(s)  ??? ? ? ?Body Mass Index Check not due until??04/04/20??and every 1??year(s)  ??? ? ? ?Hypertension Management-Blood Pressure not due until??04/04/20??and every 1??year(s)  ???Satisfied?(in the past 1 year)  ??? ??Satisfied?  ??? ? ? ?Blood Pressure Screening on??04/10/19.??Satisfied by Neal Mcfadden  ??? ? ? ?Hypertension Management-Blood Pressure on??04/10/19.??Satisfied by Neal Mcfadden  ?  ?